# Patient Record
Sex: MALE | Race: WHITE | NOT HISPANIC OR LATINO | Employment: OTHER | URBAN - METROPOLITAN AREA
[De-identification: names, ages, dates, MRNs, and addresses within clinical notes are randomized per-mention and may not be internally consistent; named-entity substitution may affect disease eponyms.]

---

## 2018-05-29 ENCOUNTER — TRANSFERRED RECORDS (OUTPATIENT)
Dept: HEALTH INFORMATION MANAGEMENT | Facility: CLINIC | Age: 68
End: 2018-05-29

## 2018-09-14 ENCOUNTER — TRANSFERRED RECORDS (OUTPATIENT)
Dept: HEALTH INFORMATION MANAGEMENT | Facility: CLINIC | Age: 68
End: 2018-09-14

## 2018-10-26 ENCOUNTER — TRANSFERRED RECORDS (OUTPATIENT)
Dept: HEALTH INFORMATION MANAGEMENT | Facility: CLINIC | Age: 68
End: 2018-10-26

## 2018-11-14 ENCOUNTER — TRANSFERRED RECORDS (OUTPATIENT)
Dept: HEALTH INFORMATION MANAGEMENT | Facility: CLINIC | Age: 68
End: 2018-11-14

## 2018-11-16 ENCOUNTER — TRANSFERRED RECORDS (OUTPATIENT)
Dept: HEALTH INFORMATION MANAGEMENT | Facility: CLINIC | Age: 68
End: 2018-11-16

## 2018-11-19 ENCOUNTER — TRANSFERRED RECORDS (OUTPATIENT)
Dept: HEALTH INFORMATION MANAGEMENT | Facility: CLINIC | Age: 68
End: 2018-11-19

## 2018-12-04 ENCOUNTER — HOSPITAL ENCOUNTER (OUTPATIENT)
Dept: MRI IMAGING | Facility: CLINIC | Age: 68
Discharge: HOME | End: 2018-12-04
Attending: OTOLARYNGOLOGY | Admitting: OTOLARYNGOLOGY
Payer: MEDICARE

## 2018-12-04 DIAGNOSIS — R42 DIZZINESS: ICD-10-CM

## 2018-12-04 PROCEDURE — 25500064 ZZH RX 255 OP 636: Performed by: OTOLARYNGOLOGY

## 2018-12-04 PROCEDURE — 70553 MRI BRAIN STEM W/O & W/DYE: CPT

## 2018-12-04 PROCEDURE — A9585 GADOBUTROL INJECTION: HCPCS | Performed by: OTOLARYNGOLOGY

## 2018-12-04 RX ORDER — GADOBUTROL 604.72 MG/ML
7.5 INJECTION INTRAVENOUS ONCE
Status: DISCONTINUED | OUTPATIENT
Start: 2018-12-04 | End: 2018-12-04 | Stop reason: CLARIF

## 2018-12-04 RX ORDER — GADOBUTROL 604.72 MG/ML
10 INJECTION INTRAVENOUS ONCE
Status: COMPLETED | OUTPATIENT
Start: 2018-12-04 | End: 2018-12-04

## 2018-12-04 RX ADMIN — GADOBUTROL 10 ML: 604.72 INJECTION INTRAVENOUS at 14:21

## 2018-12-27 RX ORDER — OMEPRAZOLE 40 MG/1
40 CAPSULE, DELAYED RELEASE ORAL DAILY
COMMUNITY

## 2018-12-27 RX ORDER — METOPROLOL TARTRATE 100 MG
100 TABLET ORAL DAILY
COMMUNITY

## 2018-12-27 RX ORDER — TRIAMTERENE AND HYDROCHLOROTHIAZIDE 37.5; 25 MG/1; MG/1
CAPSULE ORAL 2 TIMES DAILY
COMMUNITY

## 2018-12-27 RX ORDER — ASPIRIN 81 MG/1
81 TABLET ORAL DAILY
Status: ON HOLD | COMMUNITY
End: 2018-12-31

## 2018-12-27 RX ORDER — HYDRALAZINE HYDROCHLORIDE 25 MG/1
25 TABLET, FILM COATED ORAL 3 TIMES DAILY
COMMUNITY

## 2018-12-27 RX ORDER — AMLODIPINE BESYLATE 5 MG/1
10 TABLET ORAL DAILY
COMMUNITY

## 2018-12-31 ENCOUNTER — ANESTHESIA EVENT (OUTPATIENT)
Dept: SURGERY | Facility: CLINIC | Age: 68
End: 2018-12-31
Payer: MEDICARE

## 2018-12-31 ENCOUNTER — ANESTHESIA (OUTPATIENT)
Dept: SURGERY | Facility: CLINIC | Age: 68
End: 2018-12-31
Payer: MEDICARE

## 2018-12-31 ENCOUNTER — HOSPITAL ENCOUNTER (OUTPATIENT)
Facility: CLINIC | Age: 68
Discharge: HOME | End: 2018-12-31
Attending: OTOLARYNGOLOGY | Admitting: OTOLARYNGOLOGY
Payer: MEDICARE

## 2018-12-31 VITALS
WEIGHT: 218.48 LBS | HEIGHT: 70 IN | OXYGEN SATURATION: 95 % | TEMPERATURE: 98 F | DIASTOLIC BLOOD PRESSURE: 73 MMHG | SYSTOLIC BLOOD PRESSURE: 126 MMHG | BODY MASS INDEX: 31.28 KG/M2 | HEART RATE: 78 BPM | RESPIRATION RATE: 14 BRPM

## 2018-12-31 DIAGNOSIS — H81.01 MENIERE'S DISEASE (COCHLEAR HYDROPS), RIGHT: Primary | ICD-10-CM

## 2018-12-31 LAB
CREAT SERPL-MCNC: 0.76 MG/DL (ref 0.66–1.25)
GFR SERPL CREATININE-BSD FRML MDRD: >90 ML/MIN/{1.73_M2}
GLUCOSE SERPL-MCNC: 94 MG/DL (ref 70–99)
POTASSIUM SERPL-SCNC: 3.1 MMOL/L (ref 3.4–5.3)

## 2018-12-31 PROCEDURE — 25000132 ZZH RX MED GY IP 250 OP 250 PS 637: Mod: GY | Performed by: OTOLARYNGOLOGY

## 2018-12-31 PROCEDURE — 25000128 H RX IP 250 OP 636: Performed by: NURSE ANESTHETIST, CERTIFIED REGISTERED

## 2018-12-31 PROCEDURE — 25000565 ZZH ISOFLURANE, EA 15 MIN: Performed by: OTOLARYNGOLOGY

## 2018-12-31 PROCEDURE — 36415 COLL VENOUS BLD VENIPUNCTURE: CPT | Performed by: ANESTHESIOLOGY

## 2018-12-31 PROCEDURE — 82565 ASSAY OF CREATININE: CPT | Performed by: ANESTHESIOLOGY

## 2018-12-31 PROCEDURE — 25000128 H RX IP 250 OP 636: Performed by: OTOLARYNGOLOGY

## 2018-12-31 PROCEDURE — 40000170 ZZH STATISTIC PRE-PROCEDURE ASSESSMENT II: Performed by: OTOLARYNGOLOGY

## 2018-12-31 PROCEDURE — 84132 ASSAY OF SERUM POTASSIUM: CPT | Performed by: ANESTHESIOLOGY

## 2018-12-31 PROCEDURE — 71000014 ZZH RECOVERY PHASE 1 LEVEL 2 FIRST HR: Performed by: OTOLARYNGOLOGY

## 2018-12-31 PROCEDURE — 25000128 H RX IP 250 OP 636: Performed by: ANESTHESIOLOGY

## 2018-12-31 PROCEDURE — 37000009 ZZH ANESTHESIA TECHNICAL FEE, EACH ADDTL 15 MIN: Performed by: OTOLARYNGOLOGY

## 2018-12-31 PROCEDURE — 25000125 ZZHC RX 250: Performed by: NURSE ANESTHETIST, CERTIFIED REGISTERED

## 2018-12-31 PROCEDURE — 37000008 ZZH ANESTHESIA TECHNICAL FEE, 1ST 30 MIN: Performed by: OTOLARYNGOLOGY

## 2018-12-31 PROCEDURE — 71000027 ZZH RECOVERY PHASE 2 EACH 15 MINS: Performed by: OTOLARYNGOLOGY

## 2018-12-31 PROCEDURE — 36000062 ZZH SURGERY LEVEL 4 1ST 30 MIN - UMMC: Performed by: OTOLARYNGOLOGY

## 2018-12-31 PROCEDURE — A9270 NON-COVERED ITEM OR SERVICE: HCPCS | Mod: GY | Performed by: OTOLARYNGOLOGY

## 2018-12-31 PROCEDURE — 27210794 ZZH OR GENERAL SUPPLY STERILE: Performed by: OTOLARYNGOLOGY

## 2018-12-31 PROCEDURE — 82947 ASSAY GLUCOSE BLOOD QUANT: CPT | Performed by: ANESTHESIOLOGY

## 2018-12-31 PROCEDURE — 25000125 ZZHC RX 250: Performed by: OTOLARYNGOLOGY

## 2018-12-31 PROCEDURE — 36000064 ZZH SURGERY LEVEL 4 EA 15 ADDTL MIN - UMMC: Performed by: OTOLARYNGOLOGY

## 2018-12-31 DEVICE — SHEET SILICONE 38MMX51MMX0.13MM  20-10685: Type: IMPLANTABLE DEVICE | Site: EAR | Status: FUNCTIONAL

## 2018-12-31 RX ORDER — SODIUM CHLORIDE, SODIUM LACTATE, POTASSIUM CHLORIDE, CALCIUM CHLORIDE 600; 310; 30; 20 MG/100ML; MG/100ML; MG/100ML; MG/100ML
INJECTION, SOLUTION INTRAVENOUS CONTINUOUS
Status: DISCONTINUED | OUTPATIENT
Start: 2018-12-31 | End: 2018-12-31 | Stop reason: HOSPADM

## 2018-12-31 RX ORDER — ONDANSETRON 2 MG/ML
INJECTION INTRAMUSCULAR; INTRAVENOUS PRN
Status: DISCONTINUED | OUTPATIENT
Start: 2018-12-31 | End: 2018-12-31

## 2018-12-31 RX ORDER — PROPOFOL 10 MG/ML
INJECTION, EMULSION INTRAVENOUS PRN
Status: DISCONTINUED | OUTPATIENT
Start: 2018-12-31 | End: 2018-12-31

## 2018-12-31 RX ORDER — FENTANYL CITRATE 50 UG/ML
25-50 INJECTION, SOLUTION INTRAMUSCULAR; INTRAVENOUS
Status: DISCONTINUED | OUTPATIENT
Start: 2018-12-31 | End: 2018-12-31 | Stop reason: HOSPADM

## 2018-12-31 RX ORDER — OXYCODONE HYDROCHLORIDE 5 MG/1
5-10 TABLET ORAL EVERY 4 HOURS PRN
Qty: 12 TABLET | Refills: 0 | Status: SHIPPED | OUTPATIENT
Start: 2018-12-31 | End: 2019-01-03

## 2018-12-31 RX ORDER — FENTANYL CITRATE 50 UG/ML
INJECTION, SOLUTION INTRAMUSCULAR; INTRAVENOUS PRN
Status: DISCONTINUED | OUTPATIENT
Start: 2018-12-31 | End: 2018-12-31

## 2018-12-31 RX ORDER — AMOXICILLIN 500 MG/1
500 CAPSULE ORAL 2 TIMES DAILY
Qty: 20 CAPSULE | Refills: 0 | Status: SHIPPED | OUTPATIENT
Start: 2018-12-31 | End: 2019-01-10

## 2018-12-31 RX ORDER — SODIUM CHLORIDE, SODIUM LACTATE, POTASSIUM CHLORIDE, CALCIUM CHLORIDE 600; 310; 30; 20 MG/100ML; MG/100ML; MG/100ML; MG/100ML
INJECTION, SOLUTION INTRAVENOUS CONTINUOUS PRN
Status: DISCONTINUED | OUTPATIENT
Start: 2018-12-31 | End: 2018-12-31

## 2018-12-31 RX ORDER — HYDROMORPHONE HYDROCHLORIDE 1 MG/ML
.3-.5 INJECTION, SOLUTION INTRAMUSCULAR; INTRAVENOUS; SUBCUTANEOUS EVERY 5 MIN PRN
Status: DISCONTINUED | OUTPATIENT
Start: 2018-12-31 | End: 2018-12-31 | Stop reason: HOSPADM

## 2018-12-31 RX ORDER — CEFAZOLIN SODIUM 1 G/3ML
1 INJECTION, POWDER, FOR SOLUTION INTRAMUSCULAR; INTRAVENOUS SEE ADMIN INSTRUCTIONS
Status: DISCONTINUED | OUTPATIENT
Start: 2018-12-31 | End: 2018-12-31 | Stop reason: HOSPADM

## 2018-12-31 RX ORDER — CEFAZOLIN SODIUM 2 G/100ML
2 INJECTION, SOLUTION INTRAVENOUS
Status: COMPLETED | OUTPATIENT
Start: 2018-12-31 | End: 2018-12-31

## 2018-12-31 RX ORDER — MEPERIDINE HYDROCHLORIDE 25 MG/ML
12.5 INJECTION INTRAMUSCULAR; INTRAVENOUS; SUBCUTANEOUS EVERY 5 MIN PRN
Status: DISCONTINUED | OUTPATIENT
Start: 2018-12-31 | End: 2018-12-31 | Stop reason: HOSPADM

## 2018-12-31 RX ORDER — LIDOCAINE HYDROCHLORIDE 20 MG/ML
INJECTION, SOLUTION INFILTRATION; PERINEURAL PRN
Status: DISCONTINUED | OUTPATIENT
Start: 2018-12-31 | End: 2018-12-31

## 2018-12-31 RX ORDER — ONDANSETRON 4 MG/1
4 TABLET, ORALLY DISINTEGRATING ORAL EVERY 30 MIN PRN
Status: DISCONTINUED | OUTPATIENT
Start: 2018-12-31 | End: 2018-12-31 | Stop reason: HOSPADM

## 2018-12-31 RX ORDER — LIDOCAINE HYDROCHLORIDE AND EPINEPHRINE 10; 10 MG/ML; UG/ML
INJECTION, SOLUTION INFILTRATION; PERINEURAL PRN
Status: DISCONTINUED | OUTPATIENT
Start: 2018-12-31 | End: 2018-12-31 | Stop reason: HOSPADM

## 2018-12-31 RX ORDER — ASPIRIN 81 MG/1
81 TABLET ORAL DAILY
Qty: 30 TABLET | Refills: 0 | Status: SHIPPED | OUTPATIENT
Start: 2018-12-31 | End: 2019-01-30

## 2018-12-31 RX ORDER — DEXAMETHASONE SODIUM PHOSPHATE 4 MG/ML
INJECTION, SOLUTION INTRA-ARTICULAR; INTRALESIONAL; INTRAMUSCULAR; INTRAVENOUS; SOFT TISSUE PRN
Status: DISCONTINUED | OUTPATIENT
Start: 2018-12-31 | End: 2018-12-31

## 2018-12-31 RX ORDER — ONDANSETRON 2 MG/ML
4 INJECTION INTRAMUSCULAR; INTRAVENOUS EVERY 30 MIN PRN
Status: DISCONTINUED | OUTPATIENT
Start: 2018-12-31 | End: 2018-12-31 | Stop reason: HOSPADM

## 2018-12-31 RX ORDER — NALOXONE HYDROCHLORIDE 0.4 MG/ML
.1-.4 INJECTION, SOLUTION INTRAMUSCULAR; INTRAVENOUS; SUBCUTANEOUS
Status: DISCONTINUED | OUTPATIENT
Start: 2018-12-31 | End: 2018-12-31 | Stop reason: HOSPADM

## 2018-12-31 RX ORDER — EPHEDRINE SULFATE 50 MG/ML
INJECTION, SOLUTION INTRAMUSCULAR; INTRAVENOUS; SUBCUTANEOUS PRN
Status: DISCONTINUED | OUTPATIENT
Start: 2018-12-31 | End: 2018-12-31

## 2018-12-31 RX ORDER — OXYCODONE HYDROCHLORIDE 5 MG/1
5 TABLET ORAL
Status: COMPLETED | OUTPATIENT
Start: 2018-12-31 | End: 2018-12-31

## 2018-12-31 RX ORDER — EPINEPHRINE NASAL SOLUTION 1 MG/ML
SOLUTION NASAL PRN
Status: DISCONTINUED | OUTPATIENT
Start: 2018-12-31 | End: 2018-12-31 | Stop reason: HOSPADM

## 2018-12-31 RX ORDER — GENTAMICIN 40 MG/ML
INJECTION, SOLUTION INTRAMUSCULAR; INTRAVENOUS CONTINUOUS PRN
Status: DISCONTINUED | OUTPATIENT
Start: 2018-12-31 | End: 2018-12-31 | Stop reason: HOSPADM

## 2018-12-31 RX ADMIN — SODIUM CHLORIDE, POTASSIUM CHLORIDE, SODIUM LACTATE AND CALCIUM CHLORIDE: 600; 310; 30; 20 INJECTION, SOLUTION INTRAVENOUS at 09:59

## 2018-12-31 RX ADMIN — CEFAZOLIN SODIUM 1 G: 2 INJECTION, SOLUTION INTRAVENOUS at 09:54

## 2018-12-31 RX ADMIN — DEXAMETHASONE SODIUM PHOSPHATE 4 MG: 4 INJECTION, SOLUTION INTRAMUSCULAR; INTRAVENOUS at 08:07

## 2018-12-31 RX ADMIN — FENTANYL CITRATE 25 MCG: 50 INJECTION, SOLUTION INTRAMUSCULAR; INTRAVENOUS at 10:15

## 2018-12-31 RX ADMIN — ONDANSETRON 4 MG: 2 INJECTION INTRAMUSCULAR; INTRAVENOUS at 11:49

## 2018-12-31 RX ADMIN — PROPOFOL 50 MG: 10 INJECTION, EMULSION INTRAVENOUS at 10:15

## 2018-12-31 RX ADMIN — SODIUM CHLORIDE, POTASSIUM CHLORIDE, SODIUM LACTATE AND CALCIUM CHLORIDE: 600; 310; 30; 20 INJECTION, SOLUTION INTRAVENOUS at 07:28

## 2018-12-31 RX ADMIN — FENTANYL CITRATE 50 MCG: 50 INJECTION, SOLUTION INTRAMUSCULAR; INTRAVENOUS at 08:20

## 2018-12-31 RX ADMIN — PROPOFOL 150 MG: 10 INJECTION, EMULSION INTRAVENOUS at 07:41

## 2018-12-31 RX ADMIN — Medication 10 MG: at 08:46

## 2018-12-31 RX ADMIN — FENTANYL CITRATE 25 MCG: 50 INJECTION, SOLUTION INTRAMUSCULAR; INTRAVENOUS at 09:54

## 2018-12-31 RX ADMIN — Medication 0.5 MG: at 11:09

## 2018-12-31 RX ADMIN — OXYCODONE HYDROCHLORIDE 5 MG: 5 TABLET ORAL at 13:22

## 2018-12-31 RX ADMIN — Medication 5 MG: at 08:39

## 2018-12-31 RX ADMIN — ROCURONIUM BROMIDE 50 MG: 10 INJECTION INTRAVENOUS at 07:41

## 2018-12-31 RX ADMIN — ONDANSETRON 4 MG: 2 INJECTION INTRAMUSCULAR; INTRAVENOUS at 10:06

## 2018-12-31 RX ADMIN — Medication 0.2 MG: at 10:56

## 2018-12-31 RX ADMIN — SUGAMMADEX 200 MG: 100 INJECTION, SOLUTION INTRAVENOUS at 10:09

## 2018-12-31 RX ADMIN — OXYCODONE HYDROCHLORIDE 5 MG: 5 TABLET ORAL at 11:33

## 2018-12-31 RX ADMIN — LIDOCAINE HYDROCHLORIDE 100 MG: 20 INJECTION, SOLUTION INFILTRATION; PERINEURAL at 07:41

## 2018-12-31 RX ADMIN — CEFAZOLIN SODIUM 2 G: 2 INJECTION, SOLUTION INTRAVENOUS at 07:56

## 2018-12-31 RX ADMIN — Medication 0.3 MG: at 10:44

## 2018-12-31 RX ADMIN — FENTANYL CITRATE 100 MCG: 50 INJECTION, SOLUTION INTRAMUSCULAR; INTRAVENOUS at 07:41

## 2018-12-31 RX ADMIN — Medication 10 MG: at 08:04

## 2018-12-31 ASSESSMENT — MIFFLIN-ST. JEOR: SCORE: 1757.25

## 2018-12-31 NOTE — ANESTHESIA PREPROCEDURE EVALUATION
Anesthesia Evaluation       history and physical reviewed . Pt has had prior anesthetic. Type: General    No history of anesthetic complications     ROS/MED HX    Pulmonary:  - neg pulmonary ROS   (+) sleep apnea uses CPAP     Neurologic:  - neg neurologic ROS     Cardiovascular:  - neg cardiovascular ROS   (+) hypertension--CAD, --date: 2006- RCA and circumflex, 2 . : . . . :. .     METS/Exercise Tolerance:     Hematologic:  - neg hematologic  ROS     Musculoskeletal:  - neg musculoskeletal ROS     GI/Hepatic:  - neg GI/hepatic ROS   (+) GERD Asymptomatic on medication,     Renal:  - ROS Renal section negative     Endo:  - neg endo ROS       Psychiatric:  - neg psychiatric ROS     Infectious Disease:  - neg infectious disease ROS     Other:  - neg other ROS          Physical Exam  Normal systems: cardiovascular, pulmonary and dental    Airway   Mallampati: II  TM distance: >3 FB  Neck ROM: full    Dental     Cardiovascular       Pulmonary                     Anesthesia Plan      History & Physical Review  History and physical reviewed and following examination; no interval change.    ASA Status:  3 .        Plan for General with Propofol and Intravenous induction. Maintenance will be TIVA.      Additional equipment: Videolaryngoscope      Postoperative Care      Consents  Anesthetic plan, risks, benefits and alternatives discussed with:  Patient or representative..            .    Anesthesia Evaluation       history and physical reviewed . Pt has had prior anesthetic. Type: General    No history of anesthetic complications          ROS/MED HX    ENT/Pulmonary:  - neg pulmonary ROS   (+)sleep apnea, uses CPAP , . .    Neurologic:  - neg neurologic ROS     Cardiovascular:  - neg cardiovascular ROS   (+) hypertension--CAD, --date: 2006- RCA and circumflex, 2 . : . . . :. .       METS/Exercise Tolerance:     Hematologic:  - neg hematologic  ROS       Musculoskeletal:  - neg musculoskeletal ROS       GI/Hepatic:  -  neg GI/hepatic ROS   (+) GERD Asymptomatic on medication,       Renal/Genitourinary:  - ROS Renal section negative       Endo:  - neg endo ROS       Psychiatric:  - neg psychiatric ROS       Infectious Disease:  - neg infectious disease ROS       Malignancy:         Other:    - neg other ROS                     PHYSICAL EXAM:   Mental Status/Neuro: A/A/O   Airway: Facies: Feasible  Mallampati: III  Mouth/Opening: Full  TM distance: > 6 cm  Neck ROM: Full   Respiratory: Auscultation: CTAB     Resp. Rate: Normal     Resp. Effort: Normal      CV: Rhythm: Regular  Rate: Age appropriate  Heart: Normal Sounds   Comments:      Dental: Normal                  Assessment:   ASA SCORE: 3

## 2018-12-31 NOTE — ANESTHESIA POSTPROCEDURE EVALUATION
Anesthesia POST Procedure Evaluation    Patient: Arvin Britt   MRN:     [id number] Gender:   male   Age:    68 year old :      1950        Preoperative Diagnosis: Meniere's Disease   Procedure(s):  Right Endolymphatic Sac Enhancement, Sigmoid Sinus Decompression, Extended Facial Recess Approach, Complete Mastoidectomy, Myringotomy and Tube, Removal Of Scar Tissue, Gentamicin Infusion   Postop Comments: No value filed.       Anesthesia Type:  Not documented    Reportable Event: NO     PAIN: Uncomplicated   Sign Out status: Comfortable, Well controlled pain     PONV: No PONV   Sign Out status:  No Nausea or Vomiting     Neuro/Psych: Uneventful perioperative course   Sign Out Status: Preoperative baseline; Age appropriate mentation     Airway/Resp.: Uneventful perioperative course   Sign Out Status: Non labored breathing, age appropriate RR; Resp. Status within EXPECTED Parameters     CV: Uneventful perioperative course   Sign Out status: Appropriate BP and perfusion indices; Appropriate HR/Rhythm     Disposition:   Sign Out in:  PACU  Disposition:  Phase II; Home  Recovery Course: Uneventful  Follow-Up: Not required           Last Anesthesia Record Vitals:      Last PACU/Preop Vitals:  Vitals:    18 0553   BP: (!) 155/97   Pulse: 119   Resp: 16   Temp: 36.7  C (98.1  F)         Electronically Signed By: Rubin Mcgarry MD, MD, 2018, 6:48 AM

## 2018-12-31 NOTE — OP NOTE
Procedure Date: 12/31/2018      PREOPERATIVE DIAGNOSIS:  Meniere's disease, right.      POSTOPERATIVE DIAGNOSIS:  Meniere's disease, right.      PROCEDURES:   1.  Right revision endolymphatic sac enhancement.   2.  Sigmoid sinus decompression.     3.  Complete mastoidectomy.     4.  Extended facial recess approach.   5.  Removal of extensive fibrous scar tissue.     6.  Gelfoam mastoid packing for CSF and bloody drainage.     7.  Difficult case anatomy and pathology.     SURGEON:  Ricky Mueller MD      ASSISTANT:  Mary Herbert DO.      ANESTHESIA:  General endotracheal intubation.      ESTIMATED BLOOD LOSS:  50 mL.      COMPLICATIONS:  None.      DRAINS:  None.      SPECIMENS:  None.      IMPLANTS:  Silastic spacers.      FINDINGS:   1.  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   2.  Extensive scar tissue invading the Trautmann triangle.   3.  Bleeding from the posterior fossa dura noted intraoperatively.   4.  Small CSF leak in the area of the endolymphatic sac intraoperatively, controlled with Gelfoam packing.     5.  Extensive scar tissue -- removed.    6.  Medium Silastic spacers placed.     7.  A very difficult case.      INDICATIONS:  The patient is a 68-year-old male who presented to the office with recurrent Meniere's disease.  He has had 3 previous endolymphatic sac procedures with resolution of his symptoms.  In the last 2 months he had had recurrence of his symptoms, including vertigo, and workup demonstrated a right ___ weakness.  The risks versus benefits and alternatives of the endolymphatic sac procedure were discussed with the patient including bleeding, infection, CSF leak, vertigo, dizziness, decreased hearing, and facial nerve paralysis.  The patient understood the risks of the procedure and wished to proceed with surgical management.      DESCRIPTION OF PROCEDURE:  After appropriate informed consent was signed and placed in the chart, the patient  was taken to the operative suite and placed in supine position and endotracheal intubated.  Bed was rotated 90 degrees and patient was placed in Trendelenburg positioning.  The patient was prepped and draped in a sterile fashion with Betadine solution.  The microscope was sterilely draped and used throughout the duration of the case.  The timeout was performed to identify the correct patient, procedure, and laterality.      The postauricular skin was then injected with 3 mL of 1% lidocaine with epinephrine.  A #15 blade scalpel was used to incise the previous scar.  Bovie electrocautery was used for hemostasis and to elevate the ear anteriorly.  The periosteum was elevated in a separate layer.  A Lempert elevator was used to remove the scar tissue out of the mastoid vault, as well as a Derby elevator.  There was extensive serge-osteogenesis in the region of the sigmoid sinus, which was removed with a #4 sergio bur.  A #6 cutting bur was used to remove bone overlying the antrum, as well as inferiorly where a large bony spur was located.  The sigmoid sinus was identified and had previously been decompressed.  There was no bone overlying it.  There was extensive scar tissue in the region of the sigmoid sinus, which was removed with a cup forceps and a Derby elevator.  The previously placed spacers were identified and were removed with the scar tissue.  During the removal of the extensive scar tissue in the endolymphatic sac, there was evidence of a CSF leak.  Medium sized spacers were then placed and a large amount of Gelfoam was used to pack the mastoid in entirety.  There was no recurrent CSF leak at the end of the case.  It was well controlled easily.  The wound was then closed in multiple layers with a 3-0 Vicryl suture to reapproximate the periosteum, as well as the subcutaneous tissue, and staples to reapproximate the skin.  A standard mastoid dressing was then applied.  The patient was returned to Anesthesia for  uncomplicated emergence.  Dr. Mueller was present and performed the key portions of the procedure.         ANNE MUELLER MD       As dictated by SONIA PIEDRA DO            D: 2018   T: 2018   MT: MOISES      Name:     PAULA COSTELLO   MRN:      [id number]        Account:        [id number]   :      1950           Procedure Date: 2018      Document: [id number]

## 2018-12-31 NOTE — DISCHARGE INSTRUCTIONS
Same-Day Surgery   Adult Discharge Orders & Instructions     For 24 hours after surgery:  1. Get plenty of rest.  A responsible adult must stay with you for at least 24 hours after you leave the hospital.   2. Pain medication can slow your reflexes. Do not drive or use heavy equipment.  If you have weakness or tingling, don't drive or use heavy equipment until this feeling goes away.  3. Mixing alcohol and pain medication can cause dizziness and slow your breathing. It can even be fatal. Do not drink alcohol while taking pain medication.  4. Avoid strenuous or risky activities.  Ask for help when climbing stairs.   5. You may feel lightheaded.  If so, sit for a few minutes before standing.  Have someone help you get up.   6. If you have nausea (feel sick to your stomach), drink only clear liquids such as apple juice, ginger ale, broth or 7-Up.  Rest may also help.  Be sure to drink enough fluids.  Move to a regular diet as you feel able. Take pain medications with a small amount of solid food, such as toast or crackers, to avoid nausea.   7. A slight fever is normal. Call the doctor if your fever is over 100 F (37.7 C) (taken under the tongue) or lasts longer than 24 hours.  8. You may have a dry mouth, muscle aches, trouble sleeping or a sore throat.  These symptoms should go away after 24 hours.  9. Do not make important or legal decisions.   Pain Management:      1. Take pain medication (if prescribed) for pain as directed by your physician.        2. WARNING: If the pain medication you have been prescribed contains Tylenol  (acetaminophen), DO NOT take additional doses of Tylenol (acetaminophen).     Call your doctor for any of the followin.  Signs of infection (fever, growing tenderness at the surgery site, severe pain, a large amount of drainage or bleeding, foul-smelling drainage, redness, swelling).    2.  It has been over 8 to 10 hours since surgery and you are still not able to urinate (pee).    3.   Headache for over 24 hours.    4.  Numbness, tingling or weakness the day after surgery (if you had spinal anesthesia).  To contact a doctor, call _____________________________________ or:      [phone] and ask for the Resident On Call for:          __________________________________________ (answered 24 hours a day)      Emergency Department:  [facility]: [phone]  [facility]: [phone]               Rev. 10/2014

## 2018-12-31 NOTE — ANESTHESIA CARE TRANSFER NOTE
Patient: Arvin Britt    Procedure(s):  Right Endolymphatic Sac Enhancement, Sigmoid Sinus Decompression, Extended Facial Recess Approach, Complete Mastoidectomy, Removal Of Scar Tissue    Diagnosis: Meniere's Disease  Diagnosis Additional Information: No value filed.    Anesthesia Type:   General     Note:  Airway :Face Mask  Patient transferred to:PACU  Handoff Report: Identifed the Patient, Identified the Reponsible Provider, Reviewed the pertinent medical history, Discussed the surgical course, Reviewed Intra-OP anesthesia mangement and issues during anesthesia, Set expectations for post-procedure period and Allowed opportunity for questions and acknowledgement of understanding      Vitals: (Last set prior to Anesthesia Care Transfer)    CRNA VITALS  12/31/2018 0959 - 12/31/2018 1034      12/31/2018             Pulse:  85    SpO2:  95 %                Electronically Signed By: JEFF Gomez CRNA  December 31, 2018  10:34 AM

## 2018-12-31 NOTE — BRIEF OP NOTE
[facility], [place]    Brief Operative Note    Pre-operative diagnosis: Right Meniere's Disease  Post-operative diagnosis Right Meniere's disease  Procedure: Procedure(s):  Right Endolymphatic Sac Enhancement, Sigmoid Sinus Decompression, Extended Facial Recess Approach, Complete Mastoidectomy, Removal Of Scar Tissue, Difficult case  Surgeon: Surgeon(s) and Role:     * Ricky Mueller MD - Primary     * Mary Herbert DO - Assisting  Anesthesia: General   Estimated blood loss: 50 ml  Drains: None  Specimens: * No specimens in log *  Findings:    1. Large amount of neoosteogensis and scar tissue - removed.  2. CSF leak in region of endolymphatic sac  3. Medium spacers placed  4. Gelfoam mastoid packing  5. Anatomy confirms diagnosis  6. Difficult case.  Complications: None.  Implants: None.

## 2019-01-01 ENCOUNTER — EXTERNAL RECORD (OUTPATIENT)
Dept: HEALTH INFORMATION MANAGEMENT | Facility: OTHER | Age: 69
End: 2019-01-01

## 2019-01-29 ENCOUNTER — TRANSFERRED RECORDS (OUTPATIENT)
Dept: HEALTH INFORMATION MANAGEMENT | Facility: CLINIC | Age: 69
End: 2019-01-29

## 2019-04-30 ENCOUNTER — TRANSFERRED RECORDS (OUTPATIENT)
Dept: HEALTH INFORMATION MANAGEMENT | Facility: CLINIC | Age: 69
End: 2019-04-30

## 2019-05-28 ENCOUNTER — TRANSFERRED RECORDS (OUTPATIENT)
Dept: HEALTH INFORMATION MANAGEMENT | Facility: CLINIC | Age: 69
End: 2019-05-28

## 2019-08-23 ENCOUNTER — TRANSFERRED RECORDS (OUTPATIENT)
Dept: HEALTH INFORMATION MANAGEMENT | Facility: CLINIC | Age: 69
End: 2019-08-23

## 2019-10-05 ENCOUNTER — HEALTH MAINTENANCE LETTER (OUTPATIENT)
Age: 69
End: 2019-10-05

## 2019-10-22 ENCOUNTER — TRANSFERRED RECORDS (OUTPATIENT)
Dept: HEALTH INFORMATION MANAGEMENT | Facility: CLINIC | Age: 69
End: 2019-10-22

## 2020-02-10 ENCOUNTER — HEALTH MAINTENANCE LETTER (OUTPATIENT)
Age: 70
End: 2020-02-10

## 2020-07-07 ENCOUNTER — OFFICE VISIT (OUTPATIENT)
Dept: OTOLARYNGOLOGY | Facility: CLINIC | Age: 70
End: 2020-07-07
Payer: MEDICARE

## 2020-07-07 ENCOUNTER — OFFICE VISIT (OUTPATIENT)
Dept: AUDIOLOGY | Facility: CLINIC | Age: 70
End: 2020-07-07
Payer: MEDICARE

## 2020-07-07 VITALS — OXYGEN SATURATION: 99 % | HEART RATE: 56 BPM | SYSTOLIC BLOOD PRESSURE: 159 MMHG | DIASTOLIC BLOOD PRESSURE: 86 MMHG

## 2020-07-07 DIAGNOSIS — H90.3 SNHL (SENSORY-NEURAL HEARING LOSS), ASYMMETRICAL: Primary | ICD-10-CM

## 2020-07-07 DIAGNOSIS — H81.01 MENIERE'S DISEASE, RIGHT: Primary | ICD-10-CM

## 2020-07-07 PROCEDURE — 99213 OFFICE O/P EST LOW 20 MIN: CPT | Performed by: OTOLARYNGOLOGY

## 2020-07-07 PROCEDURE — 92567 TYMPANOMETRY: CPT | Performed by: AUDIOLOGIST

## 2020-07-07 PROCEDURE — 92557 COMPREHENSIVE HEARING TEST: CPT | Performed by: AUDIOLOGIST

## 2020-07-07 RX ORDER — PANTOPRAZOLE SODIUM 40 MG/1
TABLET, DELAYED RELEASE ORAL 2 TIMES DAILY
COMMUNITY
Start: 2020-05-27

## 2020-07-07 RX ORDER — TRIAMTERENE AND HYDROCHLOROTHIAZIDE 37.5; 25 MG/1; MG/1
1 CAPSULE ORAL EVERY MORNING
Qty: 30 CAPSULE | Refills: 3 | Status: SHIPPED | OUTPATIENT
Start: 2020-07-07

## 2020-07-07 RX ORDER — CHLORAL HYDRATE 500 MG
2 CAPSULE ORAL DAILY
COMMUNITY

## 2020-07-07 RX ORDER — ISOSORBIDE MONONITRATE 30 MG/1
30 TABLET, EXTENDED RELEASE ORAL DAILY
COMMUNITY
Start: 2020-05-27

## 2020-07-07 ASSESSMENT — ENCOUNTER SYMPTOMS
BRUISES/BLEEDS EASILY: 0
TINGLING: 0
VOMITING: 1
SPUTUM PRODUCTION: 0
TREMORS: 0
NAUSEA: 1
DIZZINESS: 1
DOUBLE VISION: 0
CONSTITUTIONAL NEGATIVE: 1
STRIDOR: 0
HEADACHES: 0
SINUS PAIN: 0
BLURRED VISION: 0
HEARTBURN: 1
COUGH: 0
HEMOPTYSIS: 0
PHOTOPHOBIA: 0

## 2020-07-07 NOTE — LETTER
7/7/2020         RE: Arvin Britt  [address] [place] [address]        Dear Colleague,    Thank you for referring your patient, Arvin Britt, to the [facility]. Please see a copy of my visit note below.    HPI    This is a 70 year old patient with Meniere's disease. He is a patient of Dr. Mueller for many years. He did have two episodes of vertigo in the past several weeks; lasting around 5 hours of spinning sensation. Associated with n/v, aural pressure and severe tinnitus. He has been diagnosed with left Meniere's disease. He has a hx of 4 Endolymphatic sac surgery in his left ear. No allergies, vision changes, weakness, or trauma.  His recent MRI (a year ago): WNLs.    Hearing test: Sharply sloping to severe SNHL left; moderate to severe SNHL right. Excellent recognition, left. Fair recognition in right. Tymps: WNLs.     Surgical History      12/31/2018 Enhance endolymphatic sac, dec sigmoid sinus, extnd facial recess apprch, mastoidectomy, bmt, combo (Right) Procedure: Right Endolymphatic Sac Enhancement, Sigmoid Sinus Decompression, Extended Facial Recess Approach, Complete Mastoidectomy, Removal Of Scar Tissue; Surgeon: Ricyk Mueller MD; Location: [facility] OR   6/28/2012 Enhance endolymphatic sac, dec sigmoid sinus, extnd facial recess apprch, mastoidectomy, bmt, combo         Medical History         Coronary artery disease    Dyslipidemia    Gastro-oesophageal reflux disease    Hearing loss of right ear    Hypertension    Meniere's disease    Obesity    Sleep apnea         Medications    New medications from outside sources are available for reconciliation    amLODIPine (NORVASC) 5 MG tablet     aspirin (ASA) 81 MG EC tablet     atorvastatin (LIPITOR) 20 MG tablet     fish oil-omega-3 fatty acids 1000 MG capsule     hydrALAZINE (APRESOLINE) 25 MG tablet     isosorbide mononitrate (IMDUR) 30 MG 24 hr tablet     lisinopril (PRINIVIL,ZESTRIL) 40 MG tablet     metoprolol  tartrate (LOPRESSOR) 100 MG tablet     Multiple Vitamin (MULTIVITAMIN OR)     pantoprazole (PROTONIX) 40 MG EC tablet     triamterene-HCTZ (DYAZIDE) 37.5-25 MG capsule     cholecalciferol 1000 UNIT TABS     MECLIZINE HCL PO     nitroglycerin (NITROSTAT) 0.3 MG SL tablet     omeprazole (PRILOSEC) 40 MG DR capsule        Review of Systems   Constitutional: Negative.    HENT: Positive for hearing loss and tinnitus. Negative for congestion, ear discharge, ear pain, nosebleeds and sinus pain.    Eyes: Negative for blurred vision, double vision and photophobia.   Respiratory: Negative for cough, hemoptysis, sputum production and stridor.    Gastrointestinal: Positive for heartburn, nausea and vomiting.   Skin: Negative.    Neurological: Positive for dizziness. Negative for tingling, tremors and headaches.   Endo/Heme/Allergies: Negative for environmental allergies. Does not bruise/bleed easily.         Physical Exam  Vitals signs reviewed.   Constitutional:       Appearance: Normal appearance. He is normal weight.   HENT:      Head: Normocephalic and atraumatic.      Jaw: There is normal jaw occlusion.      Right Ear: Ear canal and external ear normal. Decreased hearing noted. No tenderness. No middle ear effusion. There is no impacted cerumen. Tympanic membrane is scarred.      Left Ear: Tympanic membrane, ear canal and external ear normal. Decreased hearing noted.  No middle ear effusion. There is no impacted cerumen.      Nose: No septal deviation, laceration, mucosal edema, congestion or rhinorrhea.      Right Turbinates: Not enlarged or swollen.      Left Turbinates: Not enlarged or swollen.      Mouth/Throat:      Mouth: Mucous membranes are moist.      Tongue: No lesions. Tongue does not deviate from midline.      Palate: No mass.      Pharynx: Oropharynx is clear. Uvula midline.   Eyes:      Extraocular Movements: Extraocular movements intact.      Pupils: Pupils are equal, round, and reactive to light.    Neurological:      Mental Status: He is alert.     No Spontaneous nystagmus    A/P  This 70 year old patient with Meniere's disease did have two episodes of vertigo in the past several weeks; lasting around 5 hours of spinning sensation. He has been diagnosed with left Meniere's disease. He has a hx of 4 Endolymphatic sac surgery in his left ear.  Hearing test: Sharply sloping to severe SNHL left; moderate to severe SNHL right. Excellent recognition, left. Fair recognition in right. Tymps: WNLs. Options including intratympanic Dexamethasone inoculation were discussed and his questions were answered.       Again, thank you for allowing me to participate in the care of your patient.        Sincerely,        Brandi Rouse MD

## 2020-07-07 NOTE — PROGRESS NOTES
AUDIOLOGY REPORT    SUMMARY: Audiology visit completed. See audiogram for results.    RECOMMENDATIONS: Follow-up with ENT.    Juan Carlos Bryant  Doctor of Audiology  [place] License # [id number]

## 2020-07-07 NOTE — NURSING NOTE
Arvin Britt's goals for this visit include:   Chief Complaint   Patient presents with     Consult     Meniere's Disease     He requests these members of his care team be copied on today's visit information: Yes    PCP: Gideon Hall    Referring Provider:  No referring provider defined for this encounter.    BP (!) 159/86 (BP Location: Right arm, Patient Position: Sitting, Cuff Size: Adult Regular)   Pulse 56   SpO2 99%     Do you need any medication refills at today's visit? No    Dawn Boyd LPN

## 2020-07-07 NOTE — PROGRESS NOTES
HPI    This is a 70 year old patient with Meniere's disease. He is a patient of Dr. Mueller for many years. He did have two episodes of vertigo in the past several weeks; lasting around 5 hours of spinning sensation. Associated with n/v, aural pressure and severe tinnitus. He has been diagnosed with left Meniere's disease. He has a hx of 4 Endolymphatic sac surgery in his left ear. No allergies, vision changes, weakness, or trauma.  His recent MRI (a year ago): WNLs.    Hearing test: Sharply sloping to severe SNHL left; moderate to severe SNHL right. Excellent recognition, left. Fair recognition in right. Tymps: WNLs.     Surgical History      12/31/2018 Enhance endolymphatic sac, dec sigmoid sinus, extnd facial recess apprch, mastoidectomy, bmt, combo (Right) Procedure: Right Endolymphatic Sac Enhancement, Sigmoid Sinus Decompression, Extended Facial Recess Approach, Complete Mastoidectomy, Removal Of Scar Tissue; Surgeon: Ricky Mueller MD; Location: [facility]   6/28/2012 Enhance endolymphatic sac, dec sigmoid sinus, extnd facial recess apprch, mastoidectomy, bmt, combo         Medical History         Coronary artery disease    Dyslipidemia    Gastro-oesophageal reflux disease    Hearing loss of right ear    Hypertension    Meniere's disease    Obesity    Sleep apnea         Medications    New medications from outside sources are available for reconciliation    amLODIPine (NORVASC) 5 MG tablet     aspirin (ASA) 81 MG EC tablet     atorvastatin (LIPITOR) 20 MG tablet     fish oil-omega-3 fatty acids 1000 MG capsule     hydrALAZINE (APRESOLINE) 25 MG tablet     isosorbide mononitrate (IMDUR) 30 MG 24 hr tablet     lisinopril (PRINIVIL,ZESTRIL) 40 MG tablet     metoprolol tartrate (LOPRESSOR) 100 MG tablet     Multiple Vitamin (MULTIVITAMIN OR)     pantoprazole (PROTONIX) 40 MG EC tablet     triamterene-HCTZ (DYAZIDE) 37.5-25 MG capsule     cholecalciferol 1000 UNIT TABS     MECLIZINE HCL PO     nitroglycerin  (NITROSTAT) 0.3 MG SL tablet     omeprazole (PRILOSEC) 40 MG DR capsule        Review of Systems   Constitutional: Negative.    HENT: Positive for hearing loss and tinnitus. Negative for congestion, ear discharge, ear pain, nosebleeds and sinus pain.    Eyes: Negative for blurred vision, double vision and photophobia.   Respiratory: Negative for cough, hemoptysis, sputum production and stridor.    Gastrointestinal: Positive for heartburn, nausea and vomiting.   Skin: Negative.    Neurological: Positive for dizziness. Negative for tingling, tremors and headaches.   Endo/Heme/Allergies: Negative for environmental allergies. Does not bruise/bleed easily.         Physical Exam  Vitals signs reviewed.   Constitutional:       Appearance: Normal appearance. He is normal weight.   HENT:      Head: Normocephalic and atraumatic.      Jaw: There is normal jaw occlusion.      Right Ear: Ear canal and external ear normal. Decreased hearing noted. No tenderness. No middle ear effusion. There is no impacted cerumen. Tympanic membrane is scarred.      Left Ear: Tympanic membrane, ear canal and external ear normal. Decreased hearing noted.  No middle ear effusion. There is no impacted cerumen.      Nose: No septal deviation, laceration, mucosal edema, congestion or rhinorrhea.      Right Turbinates: Not enlarged or swollen.      Left Turbinates: Not enlarged or swollen.      Mouth/Throat:      Mouth: Mucous membranes are moist.      Tongue: No lesions. Tongue does not deviate from midline.      Palate: No mass.      Pharynx: Oropharynx is clear. Uvula midline.   Eyes:      Extraocular Movements: Extraocular movements intact.      Pupils: Pupils are equal, round, and reactive to light.   Neurological:      Mental Status: He is alert.     No Spontaneous nystagmus    A/P  This 70 year old patient with Meniere's disease did have two episodes of vertigo in the past several weeks; lasting around 5 hours of spinning sensation. He has been  diagnosed with left Meniere's disease. He has a hx of 4 Endolymphatic sac surgery in his left ear.  Hearing test: Sharply sloping to severe SNHL left; moderate to severe SNHL right. Excellent recognition, left. Fair recognition in right. Tymps: WNLs. Options including intratympanic Dexamethasone inoculation were discussed and his questions were answered.

## 2020-11-14 ENCOUNTER — HEALTH MAINTENANCE LETTER (OUTPATIENT)
Age: 70
End: 2020-11-14

## 2020-12-01 ENCOUNTER — OFFICE VISIT (OUTPATIENT)
Dept: OTOLARYNGOLOGY | Facility: CLINIC | Age: 70
End: 2020-12-01
Payer: MEDICARE

## 2020-12-01 VITALS
HEART RATE: 78 BPM | RESPIRATION RATE: 18 BRPM | OXYGEN SATURATION: 100 % | SYSTOLIC BLOOD PRESSURE: 164 MMHG | DIASTOLIC BLOOD PRESSURE: 88 MMHG

## 2020-12-01 DIAGNOSIS — H61.23 BILATERAL IMPACTED CERUMEN: Primary | ICD-10-CM

## 2020-12-01 PROCEDURE — 99213 OFFICE O/P EST LOW 20 MIN: CPT | Mod: 25 | Performed by: OTOLARYNGOLOGY

## 2020-12-01 PROCEDURE — 69210 REMOVE IMPACTED EAR WAX UNI: CPT | Performed by: OTOLARYNGOLOGY

## 2020-12-01 ASSESSMENT — PAIN SCALES - GENERAL: PAINLEVEL: NO PAIN (0)

## 2020-12-01 NOTE — LETTER
12/1/2020         RE: Arvin Britt  [address] [place] [address]        Dear Colleague,    Thank you for referring your patient, Arvin Britt, to the [facility]. Please see a copy of my visit note below.    HPI    This is a 70 year old patient with Meniere's disease. He is a patient of Dr. Mueller for many years. He did have two episodes of vertigo in the past several weeks; lasting around 5 hours of spinning sensation. Associated with n/v, aural pressure and severe tinnitus. He has been diagnosed with left Meniere's disease. He has a hx of 4 Endolymphatic sac surgery in his left ear. No allergies, vision changes, weakness, or trauma.  His recent MRI (a year ago): WNLs.    Hearing test: Sharply sloping to severe SNHL left; moderate to severe SNHL right. Excellent recognition, left. Fair recognition in right. Tymps: WNLs.     Surgical History      12/31/2018 Enhance endolymphatic sac, dec sigmoid sinus, extnd facial recess apprch, mastoidectomy, bmt, combo (Right) Procedure: Right Endolymphatic Sac Enhancement, Sigmoid Sinus Decompression, Extended Facial Recess Approach, Complete Mastoidectomy, Removal Of Scar Tissue; Surgeon: Ricky Mueller MD; Location: [facility] OR   6/28/2012 Enhance endolymphatic sac, dec sigmoid sinus, extnd facial recess apprch, mastoidectomy, bmt, combo         Medical History         Coronary artery disease    Dyslipidemia    Gastro-oesophageal reflux disease    Hearing loss of right ear    Hypertension    Meniere's disease    Obesity    Sleep apnea         Medications    New medications from outside sources are available for reconciliation    amLODIPine (NORVASC) 5 MG tablet     aspirin (ASA) 81 MG EC tablet     atorvastatin (LIPITOR) 20 MG tablet     fish oil-omega-3 fatty acids 1000 MG capsule     hydrALAZINE (APRESOLINE) 25 MG tablet     isosorbide mononitrate (IMDUR) 30 MG 24 hr tablet     lisinopril (PRINIVIL,ZESTRIL) 40 MG tablet      metoprolol tartrate (LOPRESSOR) 100 MG tablet     Multiple Vitamin (MULTIVITAMIN OR)     pantoprazole (PROTONIX) 40 MG EC tablet     triamterene-HCTZ (DYAZIDE) 37.5-25 MG capsule     cholecalciferol 1000 UNIT TABS     MECLIZINE HCL PO     nitroglycerin (NITROSTAT) 0.3 MG SL tablet     omeprazole (PRILOSEC) 40 MG DR capsule        Review of Systems   Constitutional: Negative.    HENT: Positive for hearing loss and tinnitus. Negative for congestion, ear discharge, ear pain, nosebleeds and sinus pain.    Eyes: Negative for blurred vision, double vision and photophobia.   Respiratory: Negative for cough, hemoptysis, sputum production and stridor.    Gastrointestinal: Positive for heartburn, nausea and vomiting.   Skin: Negative.    Neurological: Positive for dizziness. Negative for tingling, tremors and headaches.   Endo/Heme/Allergies: Negative for environmental allergies. Does not bruise/bleed easily.         Physical Exam  Vitals signs reviewed.   Constitutional:       Appearance: Normal appearance. He is normal weight.   HENT:      Head: Normocephalic and atraumatic.      Jaw: There is normal jaw occlusion.      Right Ear: Ear canal and external ear normal. Decreased hearing noted. No tenderness. No middle ear effusion. There is no impacted cerumen. Tympanic membrane is scarred.      Left Ear: Tympanic membrane, ear canal and external ear normal. Decreased hearing noted.  No middle ear effusion. There is no impacted cerumen.      Nose: No septal deviation, laceration, mucosal edema, congestion or rhinorrhea.      Right Turbinates: Not enlarged or swollen.      Left Turbinates: Not enlarged or swollen.      Mouth/Throat:      Mouth: Mucous membranes are moist.      Tongue: No lesions. Tongue does not deviate from midline.      Palate: No mass.      Pharynx: Oropharynx is clear. Uvula midline.   Eyes:      Extraocular Movements: Extraocular movements intact.      Pupils: Pupils are equal, round, and reactive to light.    Neurological:      Mental Status: He is alert.     No Spontaneous nystagmus.    Ear wax removal: He was seen in the room. Right ear canal was blocked with 100% ear wax that was removed with a curette and suctioning. Ear drum is intact with a dimeric membrane. He tolerated the procure well.    A/P  This 70 year old patient with Meniere's disease did have two episodes of vertigo in the past several weeks; lasting around 5 hours of spinning sensation. He has been diagnosed with left Meniere's disease. He has a hx of 4 Endolymphatic sac surgery in his left ear.  Hearing test: Sharply sloping to severe SNHL left; moderate to severe SNHL right. Excellent recognition, left. Fair recognition in right. Tymps: WNLs. Options were discussed and his questions were answered.         Again, thank you for allowing me to participate in the care of your patient.        Sincerely,        Brandi Rouse MD

## 2020-12-01 NOTE — NURSING NOTE
Arvin Britt's goals for this visit include:   Chief Complaint   Patient presents with     Follow Up     states that things are going well, has not had any bad episodes of vertigo for months. Does want ears cleaned.      He requests these members of his care team be copied on today's visit information:     PCP: Gideon Hall    Referring Provider:  No referring provider defined for this encounter.    BP (!) 164/88 (BP Location: Left arm, Patient Position: Sitting, Cuff Size: Adult Regular)   Pulse 78   Resp 18   SpO2 100%     Do you need any medication refills at today's visit? No    Cher Molina LPN

## 2020-12-01 NOTE — PROGRESS NOTES
HPI    This is a 70 year old patient with Meniere's disease. He is a patient of Dr. Mueller for many years. He did have two episodes of vertigo in the past several weeks; lasting around 5 hours of spinning sensation. Associated with n/v, aural pressure and severe tinnitus. He has been diagnosed with left Meniere's disease. He has a hx of 4 Endolymphatic sac surgery in his left ear. No allergies, vision changes, weakness, or trauma.  His recent MRI (a year ago): WNLs.    Hearing test: Sharply sloping to severe SNHL left; moderate to severe SNHL right. Excellent recognition, left. Fair recognition in right. Tymps: WNLs.     Surgical History      12/31/2018 Enhance endolymphatic sac, dec sigmoid sinus, extnd facial recess apprch, mastoidectomy, bmt, combo (Right) Procedure: Right Endolymphatic Sac Enhancement, Sigmoid Sinus Decompression, Extended Facial Recess Approach, Complete Mastoidectomy, Removal Of Scar Tissue; Surgeon: Ricky Mueller MD; Location: [facility]   6/28/2012 Enhance endolymphatic sac, dec sigmoid sinus, extnd facial recess apprch, mastoidectomy, bmt, combo         Medical History         Coronary artery disease    Dyslipidemia    Gastro-oesophageal reflux disease    Hearing loss of right ear    Hypertension    Meniere's disease    Obesity    Sleep apnea         Medications    New medications from outside sources are available for reconciliation    amLODIPine (NORVASC) 5 MG tablet     aspirin (ASA) 81 MG EC tablet     atorvastatin (LIPITOR) 20 MG tablet     fish oil-omega-3 fatty acids 1000 MG capsule     hydrALAZINE (APRESOLINE) 25 MG tablet     isosorbide mononitrate (IMDUR) 30 MG 24 hr tablet     lisinopril (PRINIVIL,ZESTRIL) 40 MG tablet     metoprolol tartrate (LOPRESSOR) 100 MG tablet     Multiple Vitamin (MULTIVITAMIN OR)     pantoprazole (PROTONIX) 40 MG EC tablet     triamterene-HCTZ (DYAZIDE) 37.5-25 MG capsule     cholecalciferol 1000 UNIT TABS     MECLIZINE HCL PO     nitroglycerin  (NITROSTAT) 0.3 MG SL tablet     omeprazole (PRILOSEC) 40 MG DR capsule        Review of Systems   Constitutional: Negative.    HENT: Positive for hearing loss and tinnitus. Negative for congestion, ear discharge, ear pain, nosebleeds and sinus pain.    Eyes: Negative for blurred vision, double vision and photophobia.   Respiratory: Negative for cough, hemoptysis, sputum production and stridor.    Gastrointestinal: Positive for heartburn, nausea and vomiting.   Skin: Negative.    Neurological: Positive for dizziness. Negative for tingling, tremors and headaches.   Endo/Heme/Allergies: Negative for environmental allergies. Does not bruise/bleed easily.         Physical Exam  Vitals signs reviewed.   Constitutional:       Appearance: Normal appearance. He is normal weight.   HENT:      Head: Normocephalic and atraumatic.      Jaw: There is normal jaw occlusion.      Right Ear: Ear canal and external ear normal. Decreased hearing noted. No tenderness. No middle ear effusion. There is no impacted cerumen. Tympanic membrane is scarred.      Left Ear: Tympanic membrane, ear canal and external ear normal. Decreased hearing noted.  No middle ear effusion. There is no impacted cerumen.      Nose: No septal deviation, laceration, mucosal edema, congestion or rhinorrhea.      Right Turbinates: Not enlarged or swollen.      Left Turbinates: Not enlarged or swollen.      Mouth/Throat:      Mouth: Mucous membranes are moist.      Tongue: No lesions. Tongue does not deviate from midline.      Palate: No mass.      Pharynx: Oropharynx is clear. Uvula midline.   Eyes:      Extraocular Movements: Extraocular movements intact.      Pupils: Pupils are equal, round, and reactive to light.   Neurological:      Mental Status: He is alert.     No Spontaneous nystagmus.    Ear wax removal: He was seen in the room. Right ear canal was blocked with 100% ear wax that was removed with a curette and suctioning. Ear drum is intact with a dimeric  membrane. He tolerated the procure well.    A/P  This 70 year old patient with Meniere's disease did have two episodes of vertigo in the past several weeks; lasting around 5 hours of spinning sensation. He has been diagnosed with left Meniere's disease. He has a hx of 4 Endolymphatic sac surgery in his left ear.  Hearing test: Sharply sloping to severe SNHL left; moderate to severe SNHL right. Excellent recognition, left. Fair recognition in right. Tymps: WNLs. Options were discussed and his questions were answered.

## 2021-03-28 ENCOUNTER — HEALTH MAINTENANCE LETTER (OUTPATIENT)
Age: 71
End: 2021-03-28

## 2021-07-06 ENCOUNTER — OFFICE VISIT (OUTPATIENT)
Dept: OTOLARYNGOLOGY | Facility: CLINIC | Age: 71
End: 2021-07-06
Payer: MEDICARE

## 2021-07-06 ENCOUNTER — OFFICE VISIT (OUTPATIENT)
Dept: AUDIOLOGY | Facility: CLINIC | Age: 71
End: 2021-07-06
Payer: MEDICARE

## 2021-07-06 VITALS — DIASTOLIC BLOOD PRESSURE: 82 MMHG | OXYGEN SATURATION: 99 % | HEART RATE: 54 BPM | SYSTOLIC BLOOD PRESSURE: 156 MMHG

## 2021-07-06 DIAGNOSIS — H90.5 SNHL (SENSORINEURAL HEARING LOSS): ICD-10-CM

## 2021-07-06 DIAGNOSIS — H90.3 SNHL (SENSORY-NEURAL HEARING LOSS), ASYMMETRICAL: Primary | ICD-10-CM

## 2021-07-06 DIAGNOSIS — H81.01 MENIERE'S DISEASE, RIGHT: Primary | ICD-10-CM

## 2021-07-06 PROCEDURE — 69210 REMOVE IMPACTED EAR WAX UNI: CPT | Mod: LT | Performed by: OTOLARYNGOLOGY

## 2021-07-06 PROCEDURE — 99207 PR NO CHARGE LOS: CPT | Performed by: AUDIOLOGIST

## 2021-07-06 PROCEDURE — 99213 OFFICE O/P EST LOW 20 MIN: CPT | Mod: 25 | Performed by: OTOLARYNGOLOGY

## 2021-07-06 PROCEDURE — 92557 COMPREHENSIVE HEARING TEST: CPT | Performed by: AUDIOLOGIST

## 2021-07-06 PROCEDURE — 92567 TYMPANOMETRY: CPT | Performed by: AUDIOLOGIST

## 2021-07-06 RX ORDER — LORATADINE 10 MG/1
10 TABLET ORAL DAILY
COMMUNITY
End: 2024-07-16

## 2021-07-06 NOTE — PROGRESS NOTES
AUDIOLOGY REPORT    SUMMARY: Audiology visit completed. See audiogram for results.    RECOMMENDATIONS: Follow-up with ENT.    Juan Carlos Bryant  Doctor of Audiology  [place] License # [id number]

## 2021-07-06 NOTE — LETTER
7/6/2021         RE: Arvin Britt  [address] [place] [address]        Dear Colleague,    Thank you for referring your patient, Arvin Britt, to the [facility]. Please see a copy of my visit note below.    HPI    This 71 year old patient with Meniere's disease is here for the f/u. He is a patient of Dr. Mueller for many years. He did have one episode of vertigo in the past several months; lasting around 5 hours of spinning sensation. Associated with n/v, aural pressure and severe tinnitus. He has been diagnosed with left Meniere's disease. He has a hx of 4 Endolymphatic sac surgery in his right ear. No allergies, vision changes, weakness, or trauma.  His recent MRI (a year ago): WNLs.    Hearing test: Sharply sloping to severe SNHL left; moderate to severe SNHL right. Excellent recognition, left. Fair recognition in right. Tymps: WNLs.     Surgical History      12/31/2018 Right Endolymphatic Sac Enhancement, Sigmoid Sinus Decompression,   6/28/2012 Enhance endolymphatic sac, dec sigmoid sinus, extnd facial recess apprch, mastoidectomy, bmt, combo         Medical History         Coronary artery disease    Dyslipidemia    Gastro-oesophageal reflux disease    Hearing loss of right ear    Hypertension    Meniere's disease    Obesity    Sleep apnea         Medications    New medications from outside sources are available for reconciliation    amLODIPine (NORVASC) 5 MG tablet     aspirin (ASA) 81 MG EC tablet     atorvastatin (LIPITOR) 20 MG tablet     fish oil-omega-3 fatty acids 1000 MG capsule     hydrALAZINE (APRESOLINE) 25 MG tablet     isosorbide mononitrate (IMDUR) 30 MG 24 hr tablet     lisinopril (PRINIVIL,ZESTRIL) 40 MG tablet     metoprolol tartrate (LOPRESSOR) 100 MG tablet     Multiple Vitamin (MULTIVITAMIN OR)     pantoprazole (PROTONIX) 40 MG EC tablet     triamterene-HCTZ (DYAZIDE) 37.5-25 MG capsule     cholecalciferol 1000 UNIT TABS     MECLIZINE HCL PO      nitroglycerin (NITROSTAT) 0.3 MG SL tablet     omeprazole (PRILOSEC) 40 MG DR capsule        Review of Systems   Constitutional: Negative.    HENT: Positive for hearing loss and tinnitus. Negative for congestion, ear discharge, ear pain, nosebleeds and sinus pain.    Eyes: Negative for blurred vision, double vision and photophobia.   Respiratory: Negative for cough, hemoptysis, sputum production and stridor.    Gastrointestinal: Positive for heartburn, nausea and vomiting.   Skin: Negative.    Neurological: Positive for dizziness. Negative for tingling, tremors and headaches.   Endo/Heme/Allergies: Negative for environmental allergies. Does not bruise/bleed easily.         Physical Exam  Vitals signs reviewed.   Constitutional:       Appearance: Normal appearance. He is normal weight.   HENT:      Head: Normocephalic and atraumatic.      Jaw: There is normal jaw occlusion.      Right Ear: Ear canal and external ear normal. Decreased hearing noted. No tenderness. No middle ear effusion. There is no impacted cerumen. Tympanic membrane is scarred.      Left Ear: Tympanic membrane, ear canal and external ear normal. Decreased hearing noted.  No middle ear effusion. There is no impacted cerumen.      Nose: No septal deviation, laceration, mucosal edema, congestion or rhinorrhea.      Right Turbinates: Not enlarged or swollen.      Left Turbinates: Not enlarged or swollen.      Mouth/Throat:      Mouth: Mucous membranes are moist.      Tongue: No lesions. Tongue does not deviate from midline.      Palate: No mass.      Pharynx: Oropharynx is clear. Uvula midline.   Eyes:      Extraocular Movements: Extraocular movements intact.      Pupils: Pupils are equal, round, and reactive to light.   Neurological:      Mental Status: He is alert.     No Spontaneous nystagmus.    Ear wax removal: He was seen in the room. Left ear canal was blocked with 60% ear wax that was removed with a curette and suctioning. Ear drum is intact  with a dimeric membrane. He tolerated the procure well.    A/P  This 71 year old patient with Meniere's disease did have two episodes of vertigo in the past several weeks; lasting around 5 hours of spinning sensation. He has been diagnosed with left Meniere's disease. He has a hx of 4 Endolymphatic sac surgery in his right ear.  Hearing test: Sharply sloping to severe SNHL left; moderate to severe SNHL right. Excellent recognition, left. Fair recognition in right. Tymps: WNLs. Options were discussed and his questions were answered. F/u in 4 months.        Again, thank you for allowing me to participate in the care of your patient.        Sincerely,        Brandi Rouse MD

## 2021-07-06 NOTE — NURSING NOTE
Arvin Britt's goals for this visit include:   Chief Complaint   Patient presents with     Follow Up     Follow up Meniere's. Had a couple episodes of dizziness. then had ears cleaned in June and now better       He requests these members of his care team be copied on today's visit information: no    PCP: Gideon Hall    Referring Provider:  No referring provider defined for this encounter.    BP (!) 156/82   Pulse 54   SpO2 99%     Do you need any medication refills at today's visit? no

## 2021-07-06 NOTE — PROGRESS NOTES
HPI    This 71 year old patient with Meniere's disease is here for the f/u. He is a patient of Dr. Mueller for many years. He did have one episode of vertigo in the past several months; lasting around 5 hours of spinning sensation. Associated with n/v, aural pressure and severe tinnitus. He has been diagnosed with left Meniere's disease. He has a hx of 4 Endolymphatic sac surgery in his right ear. No allergies, vision changes, weakness, or trauma.  His recent MRI (a year ago): WNLs.    Hearing test: Sharply sloping to severe SNHL left; moderate to severe SNHL right. Excellent recognition, left. Fair recognition in right. Tymps: WNLs.     Surgical History      12/31/2018 Right Endolymphatic Sac Enhancement, Sigmoid Sinus Decompression,   6/28/2012 Enhance endolymphatic sac, dec sigmoid sinus, extnd facial recess apprch, mastoidectomy, bmt, combo         Medical History         Coronary artery disease    Dyslipidemia    Gastro-oesophageal reflux disease    Hearing loss of right ear    Hypertension    Meniere's disease    Obesity    Sleep apnea         Medications    New medications from outside sources are available for reconciliation    amLODIPine (NORVASC) 5 MG tablet     aspirin (ASA) 81 MG EC tablet     atorvastatin (LIPITOR) 20 MG tablet     fish oil-omega-3 fatty acids 1000 MG capsule     hydrALAZINE (APRESOLINE) 25 MG tablet     isosorbide mononitrate (IMDUR) 30 MG 24 hr tablet     lisinopril (PRINIVIL,ZESTRIL) 40 MG tablet     metoprolol tartrate (LOPRESSOR) 100 MG tablet     Multiple Vitamin (MULTIVITAMIN OR)     pantoprazole (PROTONIX) 40 MG EC tablet     triamterene-HCTZ (DYAZIDE) 37.5-25 MG capsule     cholecalciferol 1000 UNIT TABS     MECLIZINE HCL PO     nitroglycerin (NITROSTAT) 0.3 MG SL tablet     omeprazole (PRILOSEC) 40 MG DR capsule        Review of Systems   Constitutional: Negative.    HENT: Positive for hearing loss and tinnitus. Negative for congestion, ear discharge, ear pain, nosebleeds  and sinus pain.    Eyes: Negative for blurred vision, double vision and photophobia.   Respiratory: Negative for cough, hemoptysis, sputum production and stridor.    Gastrointestinal: Positive for heartburn, nausea and vomiting.   Skin: Negative.    Neurological: Positive for dizziness. Negative for tingling, tremors and headaches.   Endo/Heme/Allergies: Negative for environmental allergies. Does not bruise/bleed easily.         Physical Exam  Vitals signs reviewed.   Constitutional:       Appearance: Normal appearance. He is normal weight.   HENT:      Head: Normocephalic and atraumatic.      Jaw: There is normal jaw occlusion.      Right Ear: Ear canal and external ear normal. Decreased hearing noted. No tenderness. No middle ear effusion. There is no impacted cerumen. Tympanic membrane is scarred.      Left Ear: Tympanic membrane, ear canal and external ear normal. Decreased hearing noted.  No middle ear effusion. There is no impacted cerumen.      Nose: No septal deviation, laceration, mucosal edema, congestion or rhinorrhea.      Right Turbinates: Not enlarged or swollen.      Left Turbinates: Not enlarged or swollen.      Mouth/Throat:      Mouth: Mucous membranes are moist.      Tongue: No lesions. Tongue does not deviate from midline.      Palate: No mass.      Pharynx: Oropharynx is clear. Uvula midline.   Eyes:      Extraocular Movements: Extraocular movements intact.      Pupils: Pupils are equal, round, and reactive to light.   Neurological:      Mental Status: He is alert.     No Spontaneous nystagmus.    Ear wax removal: He was seen in the room. Left ear canal was blocked with 60% ear wax that was removed with a curette and suctioning. Ear drum is intact with a dimeric membrane. He tolerated the procure well.    A/P  This 71 year old patient with Meniere's disease did have two episodes of vertigo in the past several weeks; lasting around 5 hours of spinning sensation. He has been diagnosed with left  Meniere's disease. He has a hx of 4 Endolymphatic sac surgery in his right ear.  Hearing test: Sharply sloping to severe SNHL left; moderate to severe SNHL right. Excellent recognition, left. Fair recognition in right. Tymps: WNLs. Options were discussed and his questions were answered. F/u in 4 months.

## 2021-09-12 ENCOUNTER — HEALTH MAINTENANCE LETTER (OUTPATIENT)
Age: 71
End: 2021-09-12

## 2021-10-08 ENCOUNTER — OFFICE VISIT (OUTPATIENT)
Dept: OTOLARYNGOLOGY | Facility: CLINIC | Age: 71
End: 2021-10-08
Payer: MEDICARE

## 2021-10-08 VITALS — SYSTOLIC BLOOD PRESSURE: 162 MMHG | DIASTOLIC BLOOD PRESSURE: 90 MMHG | HEART RATE: 57 BPM | OXYGEN SATURATION: 98 %

## 2021-10-08 DIAGNOSIS — J30.0 CHRONIC VASOMOTOR RHINITIS: ICD-10-CM

## 2021-10-08 DIAGNOSIS — H61.21 IMPACTED CERUMEN OF RIGHT EAR: ICD-10-CM

## 2021-10-08 DIAGNOSIS — H81.10 BPPV (BENIGN PAROXYSMAL POSITIONAL VERTIGO): ICD-10-CM

## 2021-10-08 DIAGNOSIS — H81.01 MENIERE'S DISEASE, RIGHT: Primary | ICD-10-CM

## 2021-10-08 PROCEDURE — 69210 REMOVE IMPACTED EAR WAX UNI: CPT | Performed by: OTOLARYNGOLOGY

## 2021-10-08 PROCEDURE — 99215 OFFICE O/P EST HI 40 MIN: CPT | Mod: 25 | Performed by: OTOLARYNGOLOGY

## 2021-10-08 RX ORDER — IPRATROPIUM BROMIDE 21 UG/1
2 SPRAY, METERED NASAL 3 TIMES DAILY
Qty: 30 ML | Refills: 1 | Status: SHIPPED | OUTPATIENT
Start: 2021-10-08 | End: 2021-11-02

## 2021-10-08 NOTE — PROGRESS NOTES
HPI    This 71 year old patient with Meniere's disease is here for the f/u. He is a patient of Dr. Mueller for many years. He did have one episode of vertigo in the past several months; lasting around 5 hours of spinning sensation. Associated with n/v, aural pressure and severe tinnitus. Feels that he has positional dizziness with tendency to fall. He has been diagnosed with Right Meniere's disease. He has a hx of 4 Endolymphatic sac surgery in his right ear. No allergies, vision changes, weakness, or trauma.  His recent MRI (a year ago): WNLs.    Hearing test: Sharply sloping to severe SNHL left; moderate to severe SNHL right. Excellent recognition, left. Fair recognition in right. Tymps: WNLs.     Surgical History      12/31/2018 Right Endolymphatic Sac Enhancement, Sigmoid Sinus Decompression,   6/28/2012 Enhance endolymphatic sac, dec sigmoid sinus, extnd facial recess apprch, mastoidectomy, bmt, combo         Medical History         Coronary artery disease    Dyslipidemia    Gastro-oesophageal reflux disease    Hearing loss of right ear    Hypertension    Meniere's disease    Obesity    Sleep apnea         Medications        amLODIPine (NORVASC) 5 MG tablet     aspirin (ASA) 81 MG EC tablet     atorvastatin (LIPITOR) 20 MG tablet     fish oil-omega-3 fatty acids 1000 MG capsule     hydrALAZINE (APRESOLINE) 25 MG tablet     isosorbide mononitrate (IMDUR) 30 MG 24 hr tablet     lisinopril (PRINIVIL,ZESTRIL) 40 MG tablet     metoprolol tartrate (LOPRESSOR) 100 MG tablet     Multiple Vitamin (MULTIVITAMIN OR)     pantoprazole (PROTONIX) 40 MG EC tablet     triamterene-HCTZ (DYAZIDE) 37.5-25 MG capsule     cholecalciferol 1000 UNIT TABS     MECLIZINE HCL PO     nitroglycerin (NITROSTAT) 0.3 MG SL tablet     omeprazole (PRILOSEC) 40 MG DR capsule        Review of Systems   Constitutional: Negative.    HENT: Positive for hearing loss and tinnitus. Negative for congestion, ear discharge, ear pain, nosebleeds and  sinus pain.    Eyes: Negative for blurred vision, double vision and photophobia.   Respiratory: Negative for cough, hemoptysis, sputum production and stridor.    Gastrointestinal: Positive for heartburn, nausea and vomiting.   Skin: Negative.    Neurological: Positive for dizziness. Negative for tingling, tremors and headaches.   Endo/Heme/Allergies: Negative for environmental allergies. Does not bruise/bleed easily.         Physical Exam  Vitals signs reviewed.   Constitutional:       Appearance: Normal appearance. He is normal weight.   HENT:      Head: Normocephalic and atraumatic.      Jaw: There is normal jaw occlusion.      Right Ear: Ear canal and external ear normal. Decreased hearing noted. No tenderness. No middle ear effusion. There is no impacted cerumen. Tympanic membrane is scarred.      Left Ear: Tympanic membrane, ear canal and external ear normal. Decreased hearing noted.  No middle ear effusion. There is no impacted cerumen.      Nose: No septal deviation, laceration, mucosal edema, congestion or rhinorrhea.      Right Turbinates: Not enlarged or swollen.      Left Turbinates: Not enlarged or swollen.      Mouth/Throat:      Mouth: Mucous membranes are moist.      Tongue: No lesions. Tongue does not deviate from midline.      Palate: No mass.      Pharynx: Oropharynx is clear. Uvula midline.   Eyes:      Extraocular Movements: Extraocular movements intact.      Pupils: Pupils are equal, round, and reactive to light.   Neurological:      Mental Status: He is alert.     No Spontaneous nystagmus.    Ear wax removal: He was seen in the room. Right ear canal was blocked with 60% ear wax that was removed with a curette and suctioning. Ear drum is intact with a dimeric membrane. He tolerated the procedure well.    A/P  This 71 year old patient with Meniere's disease did have two episodes of vertigo in the past several weeks; lasting around 5 hours of spinning sensation. He has been diagnosed with left  Meniere's disease. He has a hx of 4 Endolymphatic sac surgery in his right ear.  Hearing test: Sharply sloping to severe SNHL left; moderate to severe SNHL right. Excellent recognition, left. Fair recognition in right. Tymps: WNLs. Options were discussed and his questions were answered. I will give him a topical Ipratropium bromide x3 and refer him to vestibular rehab for [place] [facility]. If the problem continues, IT Dexam inoculation and PE tube insertion to his right ear. F/u in 4 months or earlier as needed.

## 2021-10-08 NOTE — NURSING NOTE
Arvin Britt's goals for this visit include:   Chief Complaint   Patient presents with     RECHECK     3 month follow up meniere's disease, right, SNHL. Patient states that his balance has worsened over the past 3 weeks.        He requests these members of his care team be copied on today's visit information:     PCP: Teresa Champion    Referring Provider:  No referring provider defined for this encounter.    BP (!) 162/90 (BP Location: Right arm, Patient Position: Sitting, Cuff Size: Adult Large)   Pulse 57   SpO2 98%     Do you need any medication refills at today's visit? No  Lamar Forde CMA

## 2021-10-08 NOTE — LETTER
10/8/2021         RE: Arvin Britt  [address] [place] [address]        Dear Colleague,    Thank you for referring your patient, Arvin Britt, to the [facility]. Please see a copy of my visit note below.    HPI    This 71 year old patient with Meniere's disease is here for the f/u. He is a patient of Dr. Mueller for many years. He did have one episode of vertigo in the past several months; lasting around 5 hours of spinning sensation. Associated with n/v, aural pressure and severe tinnitus. Feels that he has positional dizziness with tendency to fall. He has been diagnosed with Right Meniere's disease. He has a hx of 4 Endolymphatic sac surgery in his right ear. No allergies, vision changes, weakness, or trauma.  His recent MRI (a year ago): WNLs.    Hearing test: Sharply sloping to severe SNHL left; moderate to severe SNHL right. Excellent recognition, left. Fair recognition in right. Tymps: WNLs.     Surgical History      12/31/2018 Right Endolymphatic Sac Enhancement, Sigmoid Sinus Decompression,   6/28/2012 Enhance endolymphatic sac, dec sigmoid sinus, extnd facial recess apprch, mastoidectomy, bmt, combo         Medical History         Coronary artery disease    Dyslipidemia    Gastro-oesophageal reflux disease    Hearing loss of right ear    Hypertension    Meniere's disease    Obesity    Sleep apnea         Medications        amLODIPine (NORVASC) 5 MG tablet     aspirin (ASA) 81 MG EC tablet     atorvastatin (LIPITOR) 20 MG tablet     fish oil-omega-3 fatty acids 1000 MG capsule     hydrALAZINE (APRESOLINE) 25 MG tablet     isosorbide mononitrate (IMDUR) 30 MG 24 hr tablet     lisinopril (PRINIVIL,ZESTRIL) 40 MG tablet     metoprolol tartrate (LOPRESSOR) 100 MG tablet     Multiple Vitamin (MULTIVITAMIN OR)     pantoprazole (PROTONIX) 40 MG EC tablet     triamterene-HCTZ (DYAZIDE) 37.5-25 MG capsule     cholecalciferol 1000 UNIT TABS     MECLIZINE HCL PO      nitroglycerin (NITROSTAT) 0.3 MG SL tablet     omeprazole (PRILOSEC) 40 MG DR capsule        Review of Systems   Constitutional: Negative.    HENT: Positive for hearing loss and tinnitus. Negative for congestion, ear discharge, ear pain, nosebleeds and sinus pain.    Eyes: Negative for blurred vision, double vision and photophobia.   Respiratory: Negative for cough, hemoptysis, sputum production and stridor.    Gastrointestinal: Positive for heartburn, nausea and vomiting.   Skin: Negative.    Neurological: Positive for dizziness. Negative for tingling, tremors and headaches.   Endo/Heme/Allergies: Negative for environmental allergies. Does not bruise/bleed easily.         Physical Exam  Vitals signs reviewed.   Constitutional:       Appearance: Normal appearance. He is normal weight.   HENT:      Head: Normocephalic and atraumatic.      Jaw: There is normal jaw occlusion.      Right Ear: Ear canal and external ear normal. Decreased hearing noted. No tenderness. No middle ear effusion. There is no impacted cerumen. Tympanic membrane is scarred.      Left Ear: Tympanic membrane, ear canal and external ear normal. Decreased hearing noted.  No middle ear effusion. There is no impacted cerumen.      Nose: No septal deviation, laceration, mucosal edema, congestion or rhinorrhea.      Right Turbinates: Not enlarged or swollen.      Left Turbinates: Not enlarged or swollen.      Mouth/Throat:      Mouth: Mucous membranes are moist.      Tongue: No lesions. Tongue does not deviate from midline.      Palate: No mass.      Pharynx: Oropharynx is clear. Uvula midline.   Eyes:      Extraocular Movements: Extraocular movements intact.      Pupils: Pupils are equal, round, and reactive to light.   Neurological:      Mental Status: He is alert.     No Spontaneous nystagmus.    Ear wax removal: He was seen in the room. Right ear canal was blocked with 60% ear wax that was removed with a curette and suctioning. Ear drum is intact  with a dimeric membrane. He tolerated the procedure well.    A/P  This 71 year old patient with Meniere's disease did have two episodes of vertigo in the past several weeks; lasting around 5 hours of spinning sensation. He has been diagnosed with left Meniere's disease. He has a hx of 4 Endolymphatic sac surgery in his right ear.  Hearing test: Sharply sloping to severe SNHL left; moderate to severe SNHL right. Excellent recognition, left. Fair recognition in right. Tymps: WNLs. Options were discussed and his questions were answered. I will give him a topical Ipratropium bromide x3 and refer him to vestibular rehab for Martin Hallpike. If the problem continues, IT Dexam inoculation and PE tube insertion to his right ear. F/u in 4 months or earlier as needed.        Again, thank you for allowing me to participate in the care of your patient.        Sincerely,        Brandi Rouse MD

## 2021-10-12 ENCOUNTER — HOSPITAL ENCOUNTER (OUTPATIENT)
Dept: PHYSICAL THERAPY | Facility: CLINIC | Age: 71
Setting detail: THERAPIES SERIES
End: 2021-10-12
Attending: OTOLARYNGOLOGY
Payer: MEDICARE

## 2021-10-12 DIAGNOSIS — H81.01 MENIERE'S DISEASE, RIGHT: ICD-10-CM

## 2021-10-12 PROCEDURE — 95992 CANALITH REPOSITIONING PROC: CPT | Mod: GP | Performed by: PHYSICAL THERAPIST

## 2021-10-12 PROCEDURE — 97161 PT EVAL LOW COMPLEX 20 MIN: CPT | Mod: GP | Performed by: PHYSICAL THERAPIST

## 2021-10-12 NOTE — PROGRESS NOTES
Rehabilitation Services        OUTPATIENT PHYSICAL THERAPY FUNCTIONAL EVALUATION  PLAN OF TREATMENT FOR OUTPATIENT REHABILITATION  (COMPLETE FOR INITIAL CLAIMS ONLY)  Patient's Last Name, First Name, M.I.  YOB: 1950  Arvin Britt  [place]     Provider's Name   Hazel Lew PT   Medical Record No.  [id number]     Start of Care Date:  10/12/21   Onset Date:  09/14/21   Type:     _X__PT   ____OT  ____SLP Medical Diagnosis:  Meniere's Right; BPPV     PT Diagnosis:  Dizziness Visits from SOC:  1                              __________________________________________________________________________________  Plan of Treatment/Functional Goals:  neuromuscular re-education, ROM, strengthening, visual perception           GOALS  IMANII  Arvin will improve his score on the Dizziness History Inventory by 10 points or greater indicating an improvement in symptoms for daily activity tolerance.  11/10/21    Looking up  Arvin will report no symptoms with looking up for wall taping and painting in his garage and working under his cars on a daily basis for 2 weeks.  11/10/21                                                                      Therapy Frequency:  1 time/week   Predicted Duration of Therapy Intervention:  30 days    Hazel Lew PT                                    I CERTIFY THE NEED FOR THESE SERVICES FURNISHED UNDER        THIS PLAN OF TREATMENT AND WHILE UNDER MY CARE     (Physician co-signature of this document indicates review and certification of the therapy plan).                Certification Date From:  10/12/21   Certification Date To:  11/10/21    Referring Provider:  Brandi Rouse MD    Initial Assessment  See Epic Evaluation- Start of Care Date: 10/12/21

## 2021-10-19 ENCOUNTER — HOSPITAL ENCOUNTER (OUTPATIENT)
Dept: PHYSICAL THERAPY | Facility: CLINIC | Age: 71
Setting detail: THERAPIES SERIES
End: 2021-10-19
Attending: OTOLARYNGOLOGY
Payer: MEDICARE

## 2021-10-19 PROCEDURE — 97112 NEUROMUSCULAR REEDUCATION: CPT | Mod: GP | Performed by: PHYSICAL THERAPIST

## 2021-10-19 PROCEDURE — 97110 THERAPEUTIC EXERCISES: CPT | Mod: GP | Performed by: PHYSICAL THERAPIST

## 2021-11-02 DIAGNOSIS — J30.0 CHRONIC VASOMOTOR RHINITIS: ICD-10-CM

## 2021-11-02 RX ORDER — IPRATROPIUM BROMIDE 21 UG/1
2 SPRAY, METERED NASAL 3 TIMES DAILY
Qty: 30 ML | Refills: 6 | Status: SHIPPED | OUTPATIENT
Start: 2021-11-02

## 2021-11-02 NOTE — TELEPHONE ENCOUNTER
Refill request for ipratropium received from [facility].  Last appointment 10/2021.  Medication refilled as requested.  Juhi Samano RN

## 2021-11-09 ENCOUNTER — OFFICE VISIT (OUTPATIENT)
Dept: OTOLARYNGOLOGY | Facility: CLINIC | Age: 71
End: 2021-11-09
Payer: MEDICARE

## 2021-11-09 VITALS — OXYGEN SATURATION: 98 % | DIASTOLIC BLOOD PRESSURE: 96 MMHG | HEART RATE: 63 BPM | SYSTOLIC BLOOD PRESSURE: 169 MMHG

## 2021-11-09 DIAGNOSIS — H81.11 BENIGN PAROXYSMAL POSITIONAL VERTIGO OF RIGHT EAR: Primary | ICD-10-CM

## 2021-11-09 PROCEDURE — 99215 OFFICE O/P EST HI 40 MIN: CPT | Performed by: OTOLARYNGOLOGY

## 2021-11-09 NOTE — LETTER
11/9/2021         RE: Arvin Britt  [address] [place] [address]        Dear Colleague,    Thank you for referring your patient, Arvin Britt, to the [facility]. Please see a copy of my visit note below.    HPI    This 71 year old patient with Meniere's disease is here for the f/u. He is a patient of Dr. Mueller for many years. I am glad to see that he has no Meniere's episode since the last visit. Feels that he has positional dizziness with tendency to fall. Describes some level of distortion in his left ear. He has been diagnosed with Right Meniere's disease. He has a hx of 4 Endolymphatic sac surgery in his right ear. No allergies, vision changes, weakness, or trauma.  His recent MRI (a year ago): WNLs.    Hearing test: Sharply sloping to severe SNHL left; moderate to severe SNHL right. Excellent recognition, left. Fair recognition in right. Tymps: WNLs.     Surgical History      12/31/2018 Right Endolymphatic Sac Enhancement, Sigmoid Sinus Decompression,   6/28/2012 Enhance endolymphatic sac, dec sigmoid sinus, extnd facial recess apprch, mastoidectomy, bmt, combo         Medical History         Coronary artery disease    Dyslipidemia    Gastro-oesophageal reflux disease    Hearing loss of right ear    Hypertension    Meniere's disease    Obesity    Sleep apnea         Medications        amLODIPine (NORVASC) 5 MG tablet     aspirin (ASA) 81 MG EC tablet     atorvastatin (LIPITOR) 20 MG tablet     fish oil-omega-3 fatty acids 1000 MG capsule     hydrALAZINE (APRESOLINE) 25 MG tablet     isosorbide mononitrate (IMDUR) 30 MG 24 hr tablet     lisinopril (PRINIVIL,ZESTRIL) 40 MG tablet     metoprolol tartrate (LOPRESSOR) 100 MG tablet     Multiple Vitamin (MULTIVITAMIN OR)     pantoprazole (PROTONIX) 40 MG EC tablet     triamterene-HCTZ (DYAZIDE) 37.5-25 MG capsule     cholecalciferol 1000 UNIT TABS     MECLIZINE HCL PO     nitroglycerin (NITROSTAT) 0.3 MG SL tablet      omeprazole (PRILOSEC) 40 MG DR capsule        Review of Systems   Constitutional: Negative.    HENT: Positive for hearing loss and tinnitus. Negative for congestion, ear discharge, ear pain, nosebleeds and sinus pain.    Eyes: Negative for blurred vision, double vision and photophobia.   Respiratory: Negative for cough, hemoptysis, sputum production and stridor.    Gastrointestinal: Positive for heartburn, nausea and vomiting.   Skin: Negative.    Neurological: Positive for dizziness. Negative for tingling, tremors and headaches.   Endo/Heme/Allergies: Negative for environmental allergies. Does not bruise/bleed easily.         Physical Exam  Vitals signs reviewed.   Constitutional:       Appearance: Normal appearance. He is normal weight.   HENT:      Head: Normocephalic and atraumatic.      Jaw: There is normal jaw occlusion.      Right Ear: Ear canal and external ear normal. Decreased hearing noted. No tenderness. No middle ear effusion. There is no impacted cerumen. Tympanic membrane is scarred.      Left Ear: Tympanic membrane, ear canal and external ear normal. Decreased hearing noted.  No middle ear effusion. There is no impacted cerumen.      Nose: No septal deviation, laceration, mucosal edema, congestion or rhinorrhea.      Right Turbinates: Not enlarged or swollen.      Left Turbinates: Not enlarged or swollen.      Mouth/Throat:      Mouth: Mucous membranes are moist.      Tongue: No lesions. Tongue does not deviate from midline.      Palate: No mass.      Pharynx: Oropharynx is clear. Uvula midline.   Eyes:      Extraocular Movements: Extraocular movements intact.      Pupils: Pupils are equal, round, and reactive to light.   Neurological:      Mental Status: He is alert.     No Spontaneous nystagmus.    Ear wax removal: He was seen in the room. Left ear canal was impacted with ear wax that was removed with a curette and suctioning. Right Ear drum is intact with a dimeric membrane. He tolerated the  procedure well.    A/P  This 71 year old patient with Meniere's disease did have two episodes of vertigo in the past several weeks; lasting around 5 hours of spinning sensation. He has been diagnosed with left Meniere's disease. He has a hx of 4 Endolymphatic sac surgery in his right ear.  Hearing test: Sharply sloping to severe SNHL left; moderate to severe SNHL right. Excellent recognition, left. Fair recognition in right. Tymps: WNLs. Options were discussed and his questions were answered. I will give him a topical Ipratropium bromide x3 and refer him to vestibular rehab for Martin Hallpike. If the problem continues, IT Dexam inoculation and PE tube insertion to his right ear. F/u in 4 months or earlier as needed.        Again, thank you for allowing me to participate in the care of your patient.        Sincerely,        Brandi Rouse MD

## 2021-11-09 NOTE — PROGRESS NOTES
HPI    This 71 year old patient with Meniere's disease is here for the f/u. He is a patient of Dr. Mueller for many years. I am glad to see that he has no Meniere's episode since the last visit. Feels that he has positional dizziness with tendency to fall. Describes some level of distortion in his left ear. He has been diagnosed with Right Meniere's disease. He has a hx of 4 Endolymphatic sac surgery in his right ear. No allergies, vision changes, weakness, or trauma.  His recent MRI (a year ago): WNLs.    Hearing test: Sharply sloping to severe SNHL left; moderate to severe SNHL right. Excellent recognition, left. Fair recognition in right. Tymps: WNLs.     Surgical History      12/31/2018 Right Endolymphatic Sac Enhancement, Sigmoid Sinus Decompression,   6/28/2012 Enhance endolymphatic sac, dec sigmoid sinus, extnd facial recess apprch, mastoidectomy, bmt, combo         Medical History         Coronary artery disease    Dyslipidemia    Gastro-oesophageal reflux disease    Hearing loss of right ear    Hypertension    Meniere's disease    Obesity    Sleep apnea         Medications        amLODIPine (NORVASC) 5 MG tablet     aspirin (ASA) 81 MG EC tablet     atorvastatin (LIPITOR) 20 MG tablet     fish oil-omega-3 fatty acids 1000 MG capsule     hydrALAZINE (APRESOLINE) 25 MG tablet     isosorbide mononitrate (IMDUR) 30 MG 24 hr tablet     lisinopril (PRINIVIL,ZESTRIL) 40 MG tablet     metoprolol tartrate (LOPRESSOR) 100 MG tablet     Multiple Vitamin (MULTIVITAMIN OR)     pantoprazole (PROTONIX) 40 MG EC tablet     triamterene-HCTZ (DYAZIDE) 37.5-25 MG capsule     cholecalciferol 1000 UNIT TABS     MECLIZINE HCL PO     nitroglycerin (NITROSTAT) 0.3 MG SL tablet     omeprazole (PRILOSEC) 40 MG DR capsule        Review of Systems   Constitutional: Negative.    HENT: Positive for hearing loss and tinnitus. Negative for congestion, ear discharge, ear pain, nosebleeds and sinus pain.    Eyes: Negative for blurred  vision, double vision and photophobia.   Respiratory: Negative for cough, hemoptysis, sputum production and stridor.    Gastrointestinal: Positive for heartburn, nausea and vomiting.   Skin: Negative.    Neurological: Positive for dizziness. Negative for tingling, tremors and headaches.   Endo/Heme/Allergies: Negative for environmental allergies. Does not bruise/bleed easily.         Physical Exam  Vitals signs reviewed.   Constitutional:       Appearance: Normal appearance. He is normal weight.   HENT:      Head: Normocephalic and atraumatic.      Jaw: There is normal jaw occlusion.      Right Ear: Ear canal and external ear normal. Decreased hearing noted. No tenderness. No middle ear effusion. There is no impacted cerumen. Tympanic membrane is scarred.      Left Ear: Tympanic membrane, ear canal and external ear normal. Decreased hearing noted.  No middle ear effusion. There is no impacted cerumen.      Nose: No septal deviation, laceration, mucosal edema, congestion or rhinorrhea.      Right Turbinates: Not enlarged or swollen.      Left Turbinates: Not enlarged or swollen.      Mouth/Throat:      Mouth: Mucous membranes are moist.      Tongue: No lesions. Tongue does not deviate from midline.      Palate: No mass.      Pharynx: Oropharynx is clear. Uvula midline.   Eyes:      Extraocular Movements: Extraocular movements intact.      Pupils: Pupils are equal, round, and reactive to light.   Neurological:      Mental Status: He is alert.     No Spontaneous nystagmus.    Ear wax removal: He was seen in the room. Left ear canal was impacted with ear wax that was removed with a curette and suctioning. Right Ear drum is intact with a dimeric membrane. He tolerated the procedure well.    A/P  This 71 year old patient with Meniere's disease did have two episodes of vertigo in the past several weeks; lasting around 5 hours of spinning sensation. He has been diagnosed with left Meniere's disease. He has a hx of 4  Endolymphatic sac surgery in his right ear.  Hearing test: Sharply sloping to severe SNHL left; moderate to severe SNHL right. Excellent recognition, left. Fair recognition in right. Tymps: WNLs. Options were discussed and his questions were answered. I will give him a topical Ipratropium bromide x3 and refer him to vestibular rehab for [place] Hallnaty. If the problem continues, IT Dexam inoculation and PE tube insertion to his right ear. F/u in 4 months or earlier as needed.

## 2021-11-09 NOTE — NURSING NOTE
Arvin Britt's goals for this visit include:   Chief Complaint   Patient presents with     Follow Up     Menier's hasn't had any dizziness, Stuggling with hearing. hx of ear impaction.        He requests these members of his care team be copied on today's visit information: yes    PCP: Teresa Champion    Referring Provider:  No referring provider defined for this encounter.    BP (!) 169/96   Pulse 63   SpO2 98%     Do you need any medication refills at today's visit? no

## 2021-11-17 ENCOUNTER — OFFICE VISIT (OUTPATIENT)
Dept: AUDIOLOGY | Facility: CLINIC | Age: 71
End: 2021-11-17
Payer: MEDICARE

## 2021-11-17 ENCOUNTER — HOSPITAL ENCOUNTER (OUTPATIENT)
Dept: PHYSICAL THERAPY | Facility: CLINIC | Age: 71
Setting detail: THERAPIES SERIES
End: 2021-11-17
Attending: OTOLARYNGOLOGY
Payer: MEDICARE

## 2021-11-17 DIAGNOSIS — H90.3 SNHL (SENSORY-NEURAL HEARING LOSS), ASYMMETRICAL: Primary | ICD-10-CM

## 2021-11-17 PROCEDURE — 99207 PR NO CHARGE LOS: CPT | Performed by: AUDIOLOGIST

## 2021-11-17 PROCEDURE — 92567 TYMPANOMETRY: CPT | Performed by: AUDIOLOGIST

## 2021-11-17 PROCEDURE — 92557 COMPREHENSIVE HEARING TEST: CPT | Performed by: AUDIOLOGIST

## 2021-11-17 PROCEDURE — 97112 NEUROMUSCULAR REEDUCATION: CPT | Mod: GP | Performed by: PHYSICAL THERAPIST

## 2021-11-17 NOTE — PROGRESS NOTES
AUDIOLOGY REPORT    SUMMARY: Audiology visit completed. See audiogram for results.    RECOMMENDATIONS: Follow-up with ENT.    Juan Carlos Bryant  Doctor of Audiology  [place] License # [id number]

## 2021-11-17 NOTE — PROGRESS NOTES
[facility]    OUTPATIENT PHYSICAL THERAPY  PLAN OF TREATMENT FOR OUTPATIENT REHABILITATION AND DISCHARGE SUMMARY           Patient's Last Name, First Name, Arvin Hassan  [place] Date of Birth  1950   Provider's Name  [facility] Medical Record No.  [id number]    Onset Date  9/14/2021 Start of Care Date  10/12/2021   Type:     _X_PT   ___OT   ___SLP Medical Diagnosis  Meniere's Right, BPPV   PT Diagnosis  Dizziness Plan of Treatment  Frequency/Duration: 1x week for 30 days  Certification date from 11/11/2021 to 12/10/2021     Goals:  Goal Identifier DHI   Goal Description Arvin will improve his score on the Dizziness History Inventory by 10 points or greater indicating an improvement in symptoms for daily activity tolerance.   Target Date 11/10/21   Date Met  11/17/21   Progress (detail required for progress note):  Goal met with 18 point improvement.     Goal Identifier Looking up   Goal Description Arvin will report no symptoms with looking up for wall taping and painting in his garage and working under his cars on a daily basis for 2 weeks.   Target Date 11/10/21   Date Met  11/17/21   Progress (detail required for progress note):  Was able to complete with 8 reps and no symptoms today       Beginning/End Dates of Progress Note Reporting Period:  10/12/2021 to 11/17/2021    Progress Toward Goals:   Progress this reporting period: Goals are now met and currently with no vertigo.  He will continue with vestibular habituation exercises for independent management of symptoms and will be leaving for [place] next month. Goals are met and can be discharged from PT at this time.    Client Self (Subjective) Report for Progress Note Reporting Period: He has lost significant hearing and made an appt to get hearing aides adjusted. Was able to look up to complete garage  project and was able to tolerate hunting but did have one episode.    Outcome Measures (Most Recent Score):    Dizziness Handicap Inventory (score out of 100) A decrease in score by 17.18 or greater indicates a clinically significant change in symptoms.: 10                                                  I CERTIFY THE NEED FOR THESE SERVICES FURNISHED UNDER        THIS PLAN OF TREATMENT AND WHILE UNDER MY CARE     (Physician co-signature of this document indicates review and certification of the therapy plan).                Referring Provider: Brandi Rouse MD Sara Dooley, PT

## 2022-04-24 ENCOUNTER — HEALTH MAINTENANCE LETTER (OUTPATIENT)
Age: 72
End: 2022-04-24

## 2022-06-08 ENCOUNTER — OFFICE VISIT (OUTPATIENT)
Dept: AUDIOLOGY | Facility: CLINIC | Age: 72
End: 2022-06-08

## 2022-06-08 ENCOUNTER — OFFICE VISIT (OUTPATIENT)
Dept: OTOLARYNGOLOGY | Facility: CLINIC | Age: 72
End: 2022-06-08
Payer: MEDICARE

## 2022-06-08 VITALS — SYSTOLIC BLOOD PRESSURE: 173 MMHG | DIASTOLIC BLOOD PRESSURE: 87 MMHG | HEART RATE: 61 BPM

## 2022-06-08 DIAGNOSIS — H90.3 SNHL (SENSORY-NEURAL HEARING LOSS), ASYMMETRICAL: Primary | ICD-10-CM

## 2022-06-08 DIAGNOSIS — H61.23 BILATERAL IMPACTED CERUMEN: ICD-10-CM

## 2022-06-08 DIAGNOSIS — H81.03 MENIERE'S DISEASE, BILATERAL: Primary | ICD-10-CM

## 2022-06-08 DIAGNOSIS — H81.03 MENIERE'S DISEASE, BILATERAL: ICD-10-CM

## 2022-06-08 PROCEDURE — 69210 REMOVE IMPACTED EAR WAX UNI: CPT | Performed by: OTOLARYNGOLOGY

## 2022-06-08 PROCEDURE — 92557 COMPREHENSIVE HEARING TEST: CPT | Performed by: AUDIOLOGIST

## 2022-06-08 PROCEDURE — 92567 TYMPANOMETRY: CPT | Performed by: AUDIOLOGIST

## 2022-06-08 PROCEDURE — 99213 OFFICE O/P EST LOW 20 MIN: CPT | Mod: 25 | Performed by: OTOLARYNGOLOGY

## 2022-06-08 PROCEDURE — 99207 PR NO CHARGE LOS: CPT | Performed by: AUDIOLOGIST

## 2022-06-08 NOTE — PROGRESS NOTES
Chief Complaint   Patient presents with     Follow Up     Ear cleaning and Audio after   HPI    This 72 year old patient with Meniere's disease is here for the f/u. I am glad to see that he has no Meniere's episode since the last visit. Feels that he has positional dizziness with tendency to fall. Describes some level of distortion in his left ear. He has been diagnosed with Right Meniere's disease. He has a hx of 4 Endolymphatic sac surgery in his right ear. No allergies, vision changes, weakness, or trauma.  His recent MRI (a year ago): WNLs.    Hearing test: Sharply sloping to severe SNHL left; moderate to severe SNHL right. Excellent recognition, left. Fair recognition in right. Tymps: WNLs.     Surgical History      12/31/2018 Right Endolymphatic Sac Enhancement, Sigmoid Sinus Decompression,   6/28/2012 Enhance endolymphatic sac, dec sigmoid sinus, extnd facial recess apprch, mastoidectomy, bmt, combo         Medical History         Coronary artery disease    Dyslipidemia    Gastro-oesophageal reflux disease    Hearing loss of right ear    Hypertension    Meniere's disease    Obesity    Sleep apnea         Medications        amLODIPine (NORVASC) 5 MG tablet     aspirin (ASA) 81 MG EC tablet     atorvastatin (LIPITOR) 20 MG tablet     fish oil-omega-3 fatty acids 1000 MG capsule     hydrALAZINE (APRESOLINE) 25 MG tablet     isosorbide mononitrate (IMDUR) 30 MG 24 hr tablet     lisinopril (PRINIVIL,ZESTRIL) 40 MG tablet     metoprolol tartrate (LOPRESSOR) 100 MG tablet     Multiple Vitamin (MULTIVITAMIN OR)     pantoprazole (PROTONIX) 40 MG EC tablet     triamterene-HCTZ (DYAZIDE) 37.5-25 MG capsule     cholecalciferol 1000 UNIT TABS     MECLIZINE HCL PO     nitroglycerin (NITROSTAT) 0.3 MG SL tablet     omeprazole (PRILOSEC) 40 MG DR capsule        Review of Systems   Constitutional: Negative.    HENT: Positive for hearing loss and tinnitus. Negative for congestion, ear discharge, ear pain, nosebleeds and sinus  pain.    Eyes: Negative for blurred vision, double vision and photophobia.   Respiratory: Negative for cough, hemoptysis, sputum production and stridor.    Gastrointestinal: Positive for heartburn, nausea and vomiting.   Skin: Negative.    Neurological: Positive for dizziness. Negative for tingling, tremors and headaches.   Endo/Heme/Allergies: Negative for environmental allergies. Does not bruise/bleed easily.         Physical Exam  Vitals signs reviewed.   Constitutional:       Appearance: Normal appearance. He is normal weight.   HENT:      Head: Normocephalic and atraumatic.      Jaw: There is normal jaw occlusion.      Right Ear: Ear canal and external ear normal. Decreased hearing noted. No tenderness. No middle ear effusion. There is no impacted cerumen. Tympanic membrane is scarred.      Left Ear: Tympanic membrane, ear canal and external ear normal. Decreased hearing noted.  No middle ear effusion. There is no impacted cerumen.      Nose: No septal deviation, laceration, mucosal edema, congestion or rhinorrhea.      Right Turbinates: Not enlarged or swollen.      Left Turbinates: Not enlarged or swollen.      Mouth/Throat:      Mouth: Mucous membranes are moist.      Tongue: No lesions. Tongue does not deviate from midline.      Palate: No mass.      Pharynx: Oropharynx is clear. Uvula midline.   Eyes:      Extraocular Movements: Extraocular movements intact.      Pupils: Pupils are equal, round, and reactive to light.   Neurological:      Mental Status: He is alert.     No Spontaneous nystagmus.    Ear wax removal: He was seen in the room. Left ear canal was impacted with ear wax that was removed with a curette and suctioning. Right Ear drum is intact with a dimeric membrane. He tolerated the procedure well.    A/P  This 72 year old patient with Meniere's disease with a hx of 4 Endolymphatic sac surgery in his right ear. F/u in 4 months or earlier as needed.

## 2022-06-08 NOTE — LETTER
6/8/2022         RE: Arvin Britt  [address] [place] [address]        Dear Colleague,    Thank you for referring your patient, Arvin Britt, to the [facility]. Please see a copy of my visit note below.    Chief Complaint   Patient presents with     Follow Up     Ear cleaning and Audio after   HPI    This 72 year old patient with Meniere's disease is here for the f/u. I am glad to see that he has no Meniere's episode since the last visit. Feels that he has positional dizziness with tendency to fall. Describes some level of distortion in his left ear. He has been diagnosed with Right Meniere's disease. He has a hx of 4 Endolymphatic sac surgery in his right ear. No allergies, vision changes, weakness, or trauma.  His recent MRI (a year ago): WNLs.    Hearing test: Sharply sloping to severe SNHL left; moderate to severe SNHL right. Excellent recognition, left. Fair recognition in right. Tymps: WNLs.     Surgical History      12/31/2018 Right Endolymphatic Sac Enhancement, Sigmoid Sinus Decompression,   6/28/2012 Enhance endolymphatic sac, dec sigmoid sinus, extnd facial recess apprch, mastoidectomy, bmt, combo         Medical History         Coronary artery disease    Dyslipidemia    Gastro-oesophageal reflux disease    Hearing loss of right ear    Hypertension    Meniere's disease    Obesity    Sleep apnea         Medications        amLODIPine (NORVASC) 5 MG tablet     aspirin (ASA) 81 MG EC tablet     atorvastatin (LIPITOR) 20 MG tablet     fish oil-omega-3 fatty acids 1000 MG capsule     hydrALAZINE (APRESOLINE) 25 MG tablet     isosorbide mononitrate (IMDUR) 30 MG 24 hr tablet     lisinopril (PRINIVIL,ZESTRIL) 40 MG tablet     metoprolol tartrate (LOPRESSOR) 100 MG tablet     Multiple Vitamin (MULTIVITAMIN OR)     pantoprazole (PROTONIX) 40 MG EC tablet     triamterene-HCTZ (DYAZIDE) 37.5-25 MG capsule     cholecalciferol 1000 UNIT TABS     MECLIZINE HCL PO      nitroglycerin (NITROSTAT) 0.3 MG SL tablet     omeprazole (PRILOSEC) 40 MG DR capsule        Review of Systems   Constitutional: Negative.    HENT: Positive for hearing loss and tinnitus. Negative for congestion, ear discharge, ear pain, nosebleeds and sinus pain.    Eyes: Negative for blurred vision, double vision and photophobia.   Respiratory: Negative for cough, hemoptysis, sputum production and stridor.    Gastrointestinal: Positive for heartburn, nausea and vomiting.   Skin: Negative.    Neurological: Positive for dizziness. Negative for tingling, tremors and headaches.   Endo/Heme/Allergies: Negative for environmental allergies. Does not bruise/bleed easily.         Physical Exam  Vitals signs reviewed.   Constitutional:       Appearance: Normal appearance. He is normal weight.   HENT:      Head: Normocephalic and atraumatic.      Jaw: There is normal jaw occlusion.      Right Ear: Ear canal and external ear normal. Decreased hearing noted. No tenderness. No middle ear effusion. There is no impacted cerumen. Tympanic membrane is scarred.      Left Ear: Tympanic membrane, ear canal and external ear normal. Decreased hearing noted.  No middle ear effusion. There is no impacted cerumen.      Nose: No septal deviation, laceration, mucosal edema, congestion or rhinorrhea.      Right Turbinates: Not enlarged or swollen.      Left Turbinates: Not enlarged or swollen.      Mouth/Throat:      Mouth: Mucous membranes are moist.      Tongue: No lesions. Tongue does not deviate from midline.      Palate: No mass.      Pharynx: Oropharynx is clear. Uvula midline.   Eyes:      Extraocular Movements: Extraocular movements intact.      Pupils: Pupils are equal, round, and reactive to light.   Neurological:      Mental Status: He is alert.     No Spontaneous nystagmus.    Ear wax removal: He was seen in the room. Left ear canal was impacted with ear wax that was removed with a curette and suctioning. Right Ear drum is intact  with a dimeric membrane. He tolerated the procedure well.    A/P  This 72 year old patient with Meniere's disease with a hx of 4 Endolymphatic sac surgery in his right ear. F/u in 4 months or earlier as needed.          Again, thank you for allowing me to participate in the care of your patient.        Sincerely,        Brandi Rouse MD

## 2022-06-08 NOTE — PROGRESS NOTES
AUDIOLOGY REPORT    SUMMARY: Audiology visit completed. See audiogram for results.    RECOMMENDATIONS: Follow-up with ENT.    Juan Carlos Bryant  Doctor of Audiology  [place] License # [id number]

## 2022-06-08 NOTE — NURSING NOTE
Arvin Britt's chief complaint for this visit includes:  Chief Complaint   Patient presents with     Follow Up     Ear cleaning and Audio after     PCP: Teresa Champion    Referring Provider:  No referring provider defined for this encounter.    BP (!) 173/87   Pulse 61   Data Unavailable        Allergies   Allergen Reactions     Adhesive Tape      REDNESS     Amoxicillin      Other reaction(s): GI Upset, Stomach Upset  Augmentin--severe stomach upset  Augmentin--severe stomach upset  Augmentin--severe stomach upset  Augmentin--severe stomach upset       Felodipine Other (See Comments)     Flushing and hot flashes     Lortab [Hydrocodone-Acetaminophen]      AGITATION,  NIGHTMARES     Oxycodone Other (See Comments)     Patient states he got wired from it          Do you need any medication refills at today's visit?

## 2022-06-14 ENCOUNTER — LAB (OUTPATIENT)
Dept: LAB | Facility: CLINIC | Age: 72
End: 2022-06-14
Payer: MEDICARE

## 2022-06-14 ENCOUNTER — TELEPHONE (OUTPATIENT)
Dept: OTOLARYNGOLOGY | Facility: CLINIC | Age: 72
End: 2022-06-14

## 2022-06-14 DIAGNOSIS — H81.03 MENIERE'S DISEASE, BILATERAL: ICD-10-CM

## 2022-06-14 LAB
ALBUMIN SERPL-MCNC: 3.7 G/DL (ref 3.4–5)
ALP SERPL-CCNC: 97 U/L (ref 40–150)
ALT SERPL W P-5'-P-CCNC: 31 U/L (ref 0–70)
ANION GAP SERPL CALCULATED.3IONS-SCNC: 6 MMOL/L (ref 3–14)
AST SERPL W P-5'-P-CCNC: 22 U/L (ref 0–45)
BILIRUB SERPL-MCNC: 0.9 MG/DL (ref 0.2–1.3)
BUN SERPL-MCNC: 16 MG/DL (ref 7–30)
CALCIUM SERPL-MCNC: 8.6 MG/DL (ref 8.5–10.1)
CHLORIDE BLD-SCNC: 102 MMOL/L (ref 94–109)
CO2 SERPL-SCNC: 29 MMOL/L (ref 20–32)
CREAT SERPL-MCNC: 0.74 MG/DL (ref 0.66–1.25)
GFR SERPL CREATININE-BSD FRML MDRD: >90 ML/MIN/1.73M2
GLUCOSE BLD-MCNC: 89 MG/DL (ref 70–99)
POTASSIUM BLD-SCNC: 3.3 MMOL/L (ref 3.4–5.3)
PROT SERPL-MCNC: 6.7 G/DL (ref 6.8–8.8)
SODIUM SERPL-SCNC: 137 MMOL/L (ref 133–144)

## 2022-06-14 PROCEDURE — 80053 COMPREHEN METABOLIC PANEL: CPT

## 2022-06-14 PROCEDURE — 36415 COLL VENOUS BLD VENIPUNCTURE: CPT

## 2022-06-14 NOTE — TELEPHONE ENCOUNTER
Phone call to pt with lab results, Dr Rouse's recommendation to increase intake of potassium-rich foods. Pt verbalized understanding of plan. Juhi Samano RN

## 2022-06-14 NOTE — TELEPHONE ENCOUNTER
----- Message from Brandi Rouse MD sent at 6/14/2022  9:02 AM CDT -----  Arvin's potassium level is slightly low ( at the borderline). I would recommend that he can eat fruits and vegetables that are rich in potassium: Bananas, oranges, cantaloupe, grapefruit (some dried fruits, such as prunes, raisins, and dates, are also high in potassium), spinach, lentil and beans.

## 2022-08-17 ENCOUNTER — TRANSFERRED RECORDS (OUTPATIENT)
Dept: HEALTH INFORMATION MANAGEMENT | Facility: CLINIC | Age: 72
End: 2022-08-17

## 2022-10-07 ENCOUNTER — OFFICE VISIT (OUTPATIENT)
Dept: OTOLARYNGOLOGY | Facility: CLINIC | Age: 72
End: 2022-10-07
Payer: MEDICARE

## 2022-10-07 VITALS — HEART RATE: 66 BPM | SYSTOLIC BLOOD PRESSURE: 155 MMHG | DIASTOLIC BLOOD PRESSURE: 87 MMHG

## 2022-10-07 DIAGNOSIS — H90.3 SENSORINEURAL HEARING LOSS (SNHL) OF BOTH EARS: ICD-10-CM

## 2022-10-07 DIAGNOSIS — H81.03 MENIERE'S DISEASE, BILATERAL: Primary | ICD-10-CM

## 2022-10-07 DIAGNOSIS — H61.23 BILATERAL IMPACTED CERUMEN: ICD-10-CM

## 2022-10-07 DIAGNOSIS — H81.01 MENIERE'S DISEASE, RIGHT: ICD-10-CM

## 2022-10-07 PROCEDURE — 99214 OFFICE O/P EST MOD 30 MIN: CPT | Mod: 25 | Performed by: OTOLARYNGOLOGY

## 2022-10-07 PROCEDURE — 69210 REMOVE IMPACTED EAR WAX UNI: CPT | Performed by: OTOLARYNGOLOGY

## 2022-10-07 NOTE — PROGRESS NOTES
Chief Complaint   Patient presents with     Follow Up     Ear cleaning. Was at [facility] for hearing aid, and noticed a lesion in the left ear and wax in the right.      Chief Complaint   Patient presents with     Follow Up     Ear cleaning and Audio after   HPI    This 72 year old patient with Meniere's disease is here for the f/u. I am glad to see that he has no Meniere's episode since the last visit. Feels that he has positional dizziness with tendency to fall. Describes some level of distortion in his left ear. He has been diagnosed with Right Meniere's disease. He has a hx of 4 Endolymphatic sac surgery in his right ear. No allergies, vision changes, weakness, or trauma.  His recent MRI (a year ago): WNLs.    Hearing test: Sharply sloping to severe SNHL left; moderate to severe SNHL right. Excellent recognition, left. Fair recognition in right. Tymps: WNLs.     Surgical History      12/31/2018 Right Endolymphatic Sac Enhancement, Sigmoid Sinus Decompression,   6/28/2012 Enhance endolymphatic sac, dec sigmoid sinus, extnd facial recess apprch, mastoidectomy, bmt, combo         Medical History         Coronary artery disease    Dyslipidemia    Gastro-oesophageal reflux disease    Hearing loss of right ear    Hypertension    Meniere's disease    Obesity    Sleep apnea         Medications        amLODIPine (NORVASC) 5 MG tablet     aspirin (ASA) 81 MG EC tablet     atorvastatin (LIPITOR) 20 MG tablet     fish oil-omega-3 fatty acids 1000 MG capsule     hydrALAZINE (APRESOLINE) 25 MG tablet     isosorbide mononitrate (IMDUR) 30 MG 24 hr tablet     lisinopril (PRINIVIL,ZESTRIL) 40 MG tablet     metoprolol tartrate (LOPRESSOR) 100 MG tablet     Multiple Vitamin (MULTIVITAMIN OR)     pantoprazole (PROTONIX) 40 MG EC tablet     triamterene-HCTZ (DYAZIDE) 37.5-25 MG capsule     cholecalciferol 1000 UNIT TABS     MECLIZINE HCL PO     nitroglycerin (NITROSTAT) 0.3 MG SL tablet     omeprazole (PRILOSEC) 40 MG DR capsule         Review of Systems   Constitutional: Negative.    HENT: Positive for hearing loss and tinnitus. Negative for congestion, ear discharge, ear pain, nosebleeds and sinus pain.    Eyes: Negative for blurred vision, double vision and photophobia.   Respiratory: Negative for cough, hemoptysis, sputum production and stridor.    Gastrointestinal: Positive for heartburn, nausea and vomiting.   Skin: Negative.    Neurological: Positive for dizziness. Negative for tingling, tremors and headaches.   Endo/Heme/Allergies: Negative for environmental allergies. Does not bruise/bleed easily.         Physical Exam  Vitals signs reviewed.   Constitutional:       Appearance: Normal appearance. He is normal weight.   HENT:      Head: Normocephalic and atraumatic.      Jaw: There is normal jaw occlusion.      Right Ear: Ear canal and external ear normal. Decreased hearing noted. No tenderness. No middle ear effusion. There is no impacted cerumen. Tympanic membrane is scarred.      Left Ear: Tympanic membrane, ear canal and external ear normal. Decreased hearing noted.  No middle ear effusion. There is no impacted cerumen.      Nose: No septal deviation, laceration, mucosal edema, congestion or rhinorrhea.      Right Turbinates: Not enlarged or swollen.      Left Turbinates: Not enlarged or swollen.      Mouth/Throat:      Mouth: Mucous membranes are moist.      Tongue: No lesions. Tongue does not deviate from midline.      Palate: No mass.      Pharynx: Oropharynx is clear. Uvula midline.   Eyes:      Extraocular Movements: Extraocular movements intact.      Pupils: Pupils are equal, round, and reactive to light.   Neurological:      Mental Status: He is alert.     No Spontaneous nystagmus.    Ear wax removal: He was seen in the room. Left ear canal was impacted with ear wax that was removed with a curette and suctioning. Right Ear drum is intact with a dimeric membrane. He tolerated the procedure well.    A/P  This 72 year old  patient with Meniere's disease with a hx of 4 Endolymphatic sac surgery in his right ear. F/u in 4 months or earlier as needed.

## 2022-10-07 NOTE — NURSING NOTE
Arvin Britt's chief complaint for this visit includes:  Chief Complaint   Patient presents with     Follow Up     Ear cleaning. Was at [facility] for hearing aid, and noticed a lesion in the left ear and wax in the right.      PCP: Teresa Champion    Referring Provider:  No referring provider defined for this encounter.    BP (!) 155/87   Pulse 66   Data Unavailable        Allergies   Allergen Reactions     Adhesive Tape      REDNESS     Amoxicillin      Other reaction(s): GI Upset, Stomach Upset  Augmentin--severe stomach upset  Augmentin--severe stomach upset  Augmentin--severe stomach upset  Augmentin--severe stomach upset       Felodipine Other (See Comments)     Flushing and hot flashes     Lortab [Hydrocodone-Acetaminophen]      AGITATION,  NIGHTMARES     Oxycodone Other (See Comments)     Patient states he got wired from it          Do you need any medication refills at today's visit?

## 2022-10-07 NOTE — LETTER
10/7/2022         RE: Arvin Britt  [address] [place] [address]        Dear Colleague,    Thank you for referring your patient, Arvin Britt, to the [facility]. Please see a copy of my visit note below.    Chief Complaint   Patient presents with     Follow Up     Ear cleaning. Was at [facility] for hearing aid, and noticed a lesion in the left ear and wax in the right.      Chief Complaint   Patient presents with     Follow Up     Ear cleaning and Audio after   HPI    This 72 year old patient with Meniere's disease is here for the f/u. I am glad to see that he has no Meniere's episode since the last visit. Feels that he has positional dizziness with tendency to fall. Describes some level of distortion in his left ear. He has been diagnosed with Right Meniere's disease. He has a hx of 4 Endolymphatic sac surgery in his right ear. No allergies, vision changes, weakness, or trauma.  His recent MRI (a year ago): WNLs.    Hearing test: Sharply sloping to severe SNHL left; moderate to severe SNHL right. Excellent recognition, left. Fair recognition in right. Tymps: WNLs.     Surgical History      12/31/2018 Right Endolymphatic Sac Enhancement, Sigmoid Sinus Decompression,   6/28/2012 Enhance endolymphatic sac, dec sigmoid sinus, extnd facial recess apprch, mastoidectomy, bmt, combo         Medical History         Coronary artery disease    Dyslipidemia    Gastro-oesophageal reflux disease    Hearing loss of right ear    Hypertension    Meniere's disease    Obesity    Sleep apnea         Medications        amLODIPine (NORVASC) 5 MG tablet     aspirin (ASA) 81 MG EC tablet     atorvastatin (LIPITOR) 20 MG tablet     fish oil-omega-3 fatty acids 1000 MG capsule     hydrALAZINE (APRESOLINE) 25 MG tablet     isosorbide mononitrate (IMDUR) 30 MG 24 hr tablet     lisinopril (PRINIVIL,ZESTRIL) 40 MG tablet     metoprolol tartrate (LOPRESSOR) 100 MG tablet     Multiple Vitamin  (MULTIVITAMIN OR)     pantoprazole (PROTONIX) 40 MG EC tablet     triamterene-HCTZ (DYAZIDE) 37.5-25 MG capsule     cholecalciferol 1000 UNIT TABS     MECLIZINE HCL PO     nitroglycerin (NITROSTAT) 0.3 MG SL tablet     omeprazole (PRILOSEC) 40 MG DR capsule        Review of Systems   Constitutional: Negative.    HENT: Positive for hearing loss and tinnitus. Negative for congestion, ear discharge, ear pain, nosebleeds and sinus pain.    Eyes: Negative for blurred vision, double vision and photophobia.   Respiratory: Negative for cough, hemoptysis, sputum production and stridor.    Gastrointestinal: Positive for heartburn, nausea and vomiting.   Skin: Negative.    Neurological: Positive for dizziness. Negative for tingling, tremors and headaches.   Endo/Heme/Allergies: Negative for environmental allergies. Does not bruise/bleed easily.         Physical Exam  Vitals signs reviewed.   Constitutional:       Appearance: Normal appearance. He is normal weight.   HENT:      Head: Normocephalic and atraumatic.      Jaw: There is normal jaw occlusion.      Right Ear: Ear canal and external ear normal. Decreased hearing noted. No tenderness. No middle ear effusion. There is no impacted cerumen. Tympanic membrane is scarred.      Left Ear: Tympanic membrane, ear canal and external ear normal. Decreased hearing noted.  No middle ear effusion. There is no impacted cerumen.      Nose: No septal deviation, laceration, mucosal edema, congestion or rhinorrhea.      Right Turbinates: Not enlarged or swollen.      Left Turbinates: Not enlarged or swollen.      Mouth/Throat:      Mouth: Mucous membranes are moist.      Tongue: No lesions. Tongue does not deviate from midline.      Palate: No mass.      Pharynx: Oropharynx is clear. Uvula midline.   Eyes:      Extraocular Movements: Extraocular movements intact.      Pupils: Pupils are equal, round, and reactive to light.   Neurological:      Mental Status: He is alert.     No  Spontaneous nystagmus.    Ear wax removal: He was seen in the room. Left ear canal was impacted with ear wax that was removed with a curette and suctioning. Right Ear drum is intact with a dimeric membrane. He tolerated the procedure well.    A/P  This 72 year old patient with Meniere's disease with a hx of 4 Endolymphatic sac surgery in his right ear. F/u in 4 months or earlier as needed.            Again, thank you for allowing me to participate in the care of your patient.        Sincerely,        Brandi Rouse MD

## 2023-04-26 ENCOUNTER — OFFICE VISIT (OUTPATIENT)
Dept: OTOLARYNGOLOGY | Facility: CLINIC | Age: 73
End: 2023-04-26
Payer: MEDICARE

## 2023-04-26 VITALS — SYSTOLIC BLOOD PRESSURE: 142 MMHG | HEART RATE: 58 BPM | DIASTOLIC BLOOD PRESSURE: 81 MMHG

## 2023-04-26 DIAGNOSIS — H81.03 MENIERE'S DISEASE, BILATERAL: Primary | ICD-10-CM

## 2023-04-26 DIAGNOSIS — H61.23 BILATERAL IMPACTED CERUMEN: ICD-10-CM

## 2023-04-26 PROCEDURE — 99214 OFFICE O/P EST MOD 30 MIN: CPT | Mod: 25 | Performed by: OTOLARYNGOLOGY

## 2023-04-26 PROCEDURE — 69210 REMOVE IMPACTED EAR WAX UNI: CPT | Mod: LT | Performed by: OTOLARYNGOLOGY

## 2023-04-26 NOTE — PROGRESS NOTES
Arvin Britt's chief complaint for this visit includes:  Chief Complaint   Patient presents with     Follow Up     Ear cleaning. Hx of Menier's. Had last episode was in Dec.      PCP: Teresa Champion    Referring Provider:  No referring provider defined for this encounter.    BP (!) 142/81   Pulse 58     Chief Complaint   Patient presents with     Follow Up     Ear cleaning. Was at [facility] for hearing aid, and noticed a lesion in the left ear and wax in the right.      Chief Complaint   Patient presents with     Follow Up     Ear cleaning and Audio after   HPI    This 73 year old patient with Meniere's disease is here for the f/u. I am glad to see that he has no Meniere's episode since the last visit. He has been diagnosed with Right Meniere's disease. He has a hx of 4 Endolymphatic sac surgery in his right ear. No allergies, vision changes, weakness, or trauma.  His recent MRI (a year ago): WNLs.    Hearing test: Sharply sloping to severe SNHL left; moderate to severe SNHL right. Excellent recognition, left. Fair recognition in right. Tymps: WNLs.     Surgical History      12/31/2018 Right Endolymphatic Sac Enhancement, Sigmoid Sinus Decompression,   6/28/2012 Enhance endolymphatic sac, dec sigmoid sinus, extnd facial recess apprch, mastoidectomy, bmt, combo         Medical History         Coronary artery disease    Dyslipidemia    Gastro-oesophageal reflux disease    Hearing loss of right ear    Hypertension    Meniere's disease    Obesity    Sleep apnea         Medications        amLODIPine (NORVASC) 5 MG tablet     aspirin (ASA) 81 MG EC tablet     atorvastatin (LIPITOR) 20 MG tablet     fish oil-omega-3 fatty acids 1000 MG capsule     hydrALAZINE (APRESOLINE) 25 MG tablet     isosorbide mononitrate (IMDUR) 30 MG 24 hr tablet     lisinopril (PRINIVIL,ZESTRIL) 40 MG tablet     metoprolol tartrate (LOPRESSOR) 100 MG tablet     Multiple Vitamin (MULTIVITAMIN OR)     pantoprazole (PROTONIX) 40 MG EC tablet      triamterene-HCTZ (DYAZIDE) 37.5-25 MG capsule     cholecalciferol 1000 UNIT TABS     MECLIZINE HCL PO     nitroglycerin (NITROSTAT) 0.3 MG SL tablet     omeprazole (PRILOSEC) 40 MG DR capsule        Review of Systems   Constitutional: Negative.    HENT: Positive for hearing loss and tinnitus. Negative for congestion, ear discharge, ear pain, nosebleeds and sinus pain.    Eyes: Negative for blurred vision, double vision and photophobia.   Respiratory: Negative for cough, hemoptysis, sputum production and stridor.    Gastrointestinal: Positive for heartburn, nausea and vomiting.   Skin: Negative.    Neurological: Positive for dizziness. Negative for tingling, tremors and headaches.   Endo/Heme/Allergies: Negative for environmental allergies. Does not bruise/bleed easily.       Physical Exam  Vitals signs reviewed.   Constitutional:       Appearance: Normal appearance. He is normal weight.   HENT:      Head: Normocephalic and atraumatic.      Jaw: There is normal jaw occlusion.      Right Ear: Ear canal and external ear normal. Decreased hearing noted. No tenderness. No middle ear effusion. There is no impacted cerumen. Tympanic membrane is scarred.      Left Ear: Tympanic membrane, ear canal and external ear normal. Decreased hearing noted.  No middle ear effusion. There is no impacted cerumen.      No Spontaneous nystagmus.    Ear wax removal: He was seen in the room. Left ear canal was impacted with ear wax that was removed with a curette and suctioning. Right Ear drum is intact with a dimeric membrane. He tolerated the procedure well.    A/P  This 73 year old patient with Meniere's disease with a hx of 4 Endolymphatic sac surgery in his right ear. F/u in 6 months or earlier as needed.

## 2023-04-26 NOTE — LETTER
4/26/2023         RE: Arvin Britt  [address] [place] [address]        Dear Colleague,    Thank you for referring your patient, Arvin Britt, to the [facility]. Please see a copy of my visit note below.    Arvin Britt's chief complaint for this visit includes:  Chief Complaint   Patient presents with     Follow Up     Ear cleaning. Hx of Menier's. Had last episode was in Dec.      PCP: Teresa Champion    Referring Provider:  No referring provider defined for this encounter.    BP (!) 142/81   Pulse 58     Chief Complaint   Patient presents with     Follow Up     Ear cleaning. Was at [facility] for hearing aid, and noticed a lesion in the left ear and wax in the right.      Chief Complaint   Patient presents with     Follow Up     Ear cleaning and Audio after   HPI    This 73 year old patient with Meniere's disease is here for the f/u. I am glad to see that he has no Meniere's episode since the last visit. He has been diagnosed with Right Meniere's disease. He has a hx of 4 Endolymphatic sac surgery in his right ear. No allergies, vision changes, weakness, or trauma.  His recent MRI (a year ago): WNLs.    Hearing test: Sharply sloping to severe SNHL left; moderate to severe SNHL right. Excellent recognition, left. Fair recognition in right. Tymps: WNLs.     Surgical History      12/31/2018 Right Endolymphatic Sac Enhancement, Sigmoid Sinus Decompression,   6/28/2012 Enhance endolymphatic sac, dec sigmoid sinus, extnd facial recess apprch, mastoidectomy, bmt, combo         Medical History         Coronary artery disease    Dyslipidemia    Gastro-oesophageal reflux disease    Hearing loss of right ear    Hypertension    Meniere's disease    Obesity    Sleep apnea         Medications        amLODIPine (NORVASC) 5 MG tablet     aspirin (ASA) 81 MG EC tablet     atorvastatin (LIPITOR) 20 MG tablet     fish oil-omega-3 fatty acids 1000 MG capsule     hydrALAZINE  (APRESOLINE) 25 MG tablet     isosorbide mononitrate (IMDUR) 30 MG 24 hr tablet     lisinopril (PRINIVIL,ZESTRIL) 40 MG tablet     metoprolol tartrate (LOPRESSOR) 100 MG tablet     Multiple Vitamin (MULTIVITAMIN OR)     pantoprazole (PROTONIX) 40 MG EC tablet     triamterene-HCTZ (DYAZIDE) 37.5-25 MG capsule     cholecalciferol 1000 UNIT TABS     MECLIZINE HCL PO     nitroglycerin (NITROSTAT) 0.3 MG SL tablet     omeprazole (PRILOSEC) 40 MG DR capsule        Review of Systems   Constitutional: Negative.    HENT: Positive for hearing loss and tinnitus. Negative for congestion, ear discharge, ear pain, nosebleeds and sinus pain.    Eyes: Negative for blurred vision, double vision and photophobia.   Respiratory: Negative for cough, hemoptysis, sputum production and stridor.    Gastrointestinal: Positive for heartburn, nausea and vomiting.   Skin: Negative.    Neurological: Positive for dizziness. Negative for tingling, tremors and headaches.   Endo/Heme/Allergies: Negative for environmental allergies. Does not bruise/bleed easily.       Physical Exam  Vitals signs reviewed.   Constitutional:       Appearance: Normal appearance. He is normal weight.   HENT:      Head: Normocephalic and atraumatic.      Jaw: There is normal jaw occlusion.      Right Ear: Ear canal and external ear normal. Decreased hearing noted. No tenderness. No middle ear effusion. There is no impacted cerumen. Tympanic membrane is scarred.      Left Ear: Tympanic membrane, ear canal and external ear normal. Decreased hearing noted.  No middle ear effusion. There is no impacted cerumen.      No Spontaneous nystagmus.    Ear wax removal: He was seen in the room. Left ear canal was impacted with ear wax that was removed with a curette and suctioning. Right Ear drum is intact with a dimeric membrane. He tolerated the procedure well.    A/P  This 73 year old patient with Meniere's disease with a hx of 4 Endolymphatic sac surgery in his right ear. F/u in 6  months or earlier as needed.            Again, thank you for allowing me to participate in the care of your patient.        Sincerely,        Brandi Rouse MD

## 2023-04-26 NOTE — NURSING NOTE
Arvin Britt's chief complaint for this visit includes:  Chief Complaint   Patient presents with     Follow Up     Ear cleaning. Hx of Menier's. Had last episode was in Dec.      PCP: Teresa Champion    Referring Provider:  No referring provider defined for this encounter.    BP (!) 142/81   Pulse 58

## 2023-06-01 ENCOUNTER — HEALTH MAINTENANCE LETTER (OUTPATIENT)
Age: 73
End: 2023-06-01

## 2023-12-06 ENCOUNTER — OFFICE VISIT (OUTPATIENT)
Dept: AUDIOLOGY | Facility: CLINIC | Age: 73
End: 2023-12-06
Payer: MEDICARE

## 2023-12-06 ENCOUNTER — OFFICE VISIT (OUTPATIENT)
Dept: OTOLARYNGOLOGY | Facility: CLINIC | Age: 73
End: 2023-12-06
Payer: MEDICARE

## 2023-12-06 VITALS — HEART RATE: 65 BPM | SYSTOLIC BLOOD PRESSURE: 126 MMHG | DIASTOLIC BLOOD PRESSURE: 76 MMHG

## 2023-12-06 DIAGNOSIS — H81.03 MENIERE'S DISEASE, BILATERAL: ICD-10-CM

## 2023-12-06 DIAGNOSIS — H61.23 BILATERAL IMPACTED CERUMEN: Primary | ICD-10-CM

## 2023-12-06 DIAGNOSIS — H90.3 SENSORINEURAL HEARING LOSS (SNHL) OF BOTH EARS: ICD-10-CM

## 2023-12-06 DIAGNOSIS — H90.3 SNHL (SENSORY-NEURAL HEARING LOSS), ASYMMETRICAL: Primary | ICD-10-CM

## 2023-12-06 PROCEDURE — 99214 OFFICE O/P EST MOD 30 MIN: CPT | Mod: 25 | Performed by: OTOLARYNGOLOGY

## 2023-12-06 PROCEDURE — 69210 REMOVE IMPACTED EAR WAX UNI: CPT | Mod: LT | Performed by: OTOLARYNGOLOGY

## 2023-12-06 PROCEDURE — 92557 COMPREHENSIVE HEARING TEST: CPT | Performed by: AUDIOLOGIST

## 2023-12-06 PROCEDURE — 92567 TYMPANOMETRY: CPT | Performed by: AUDIOLOGIST

## 2023-12-06 NOTE — PROGRESS NOTES
AUDIOLOGY REPORT    SUMMARY: Audiology visit completed. See audiogram for results.    RECOMMENDATIONS: Follow-up with ENT.    Juan Carlos Bryant  Doctor of Audiology  [place] License # [id number]

## 2023-12-06 NOTE — LETTER
12/6/2023         RE: Arvin Britt  [address] [place] [address]        Dear Colleague,    Thank you for referring your patient, Arvin Britt, to the [facility]. Please see a copy of my visit note below.    Arvin Britt's chief complaint for this visit includes:  Chief Complaint   Patient presents with     Follow Up     Ear Cleaning - possible Audiogram     PCP: Teresa Champion    Referring Provider:  No referring provider defined for this encounter.    /76 (BP Location: Right arm, Patient Position: Sitting, Cuff Size: Adult Large)   Pulse 65     Cassy Pham MA on 12/6/2023 at 10:31 AM      HPI    This 73 year old patient with Meniere's disease is here for the f/u. I am glad to see that he has no Meniere's episode since the last visit. He has been diagnosed with Right Meniere's disease. He has a hx of 4 Endolymphatic sac surgery in his right ear. No allergies, vision changes, weakness, or trauma.  His recent MRI (a year ago): WNLs.    Hearing test: Sharply sloping to severe SNHL left; moderate to severe SNHL right. Excellent recognition, left. Fair recognition in right. Tymps: WNLs.     Surgical History      12/31/2018 Right Endolymphatic Sac Enhancement, Sigmoid Sinus Decompression,   6/28/2012 Enhance endolymphatic sac, dec sigmoid sinus, extnd facial recess apprch, mastoidectomy, bmt, combo         Medical History         Coronary artery disease    Dyslipidemia    Gastro-oesophageal reflux disease    Hearing loss of right ear    Hypertension    Meniere's disease    Obesity    Sleep apnea         Medications        amLODIPine (NORVASC) 5 MG tablet     aspirin (ASA) 81 MG EC tablet     atorvastatin (LIPITOR) 20 MG tablet     fish oil-omega-3 fatty acids 1000 MG capsule     hydrALAZINE (APRESOLINE) 25 MG tablet     isosorbide mononitrate (IMDUR) 30 MG 24 hr tablet     lisinopril (PRINIVIL,ZESTRIL) 40 MG tablet     metoprolol tartrate (LOPRESSOR) 100 MG  tablet     Multiple Vitamin (MULTIVITAMIN OR)     pantoprazole (PROTONIX) 40 MG EC tablet     triamterene-HCTZ (DYAZIDE) 37.5-25 MG capsule     cholecalciferol 1000 UNIT TABS     MECLIZINE HCL PO     nitroglycerin (NITROSTAT) 0.3 MG SL tablet     omeprazole (PRILOSEC) 40 MG DR capsule        Review of Systems   Constitutional: Negative.    HENT: Positive for hearing loss and tinnitus. Negative for congestion, ear discharge, ear pain, nosebleeds and sinus pain.    Eyes: Negative for blurred vision, double vision and photophobia.   Respiratory: Negative for cough, hemoptysis, sputum production and stridor.    Gastrointestinal: Positive for heartburn, nausea and vomiting.   Skin: Negative.    Neurological: Positive for dizziness. Negative for tingling, tremors and headaches.   Endo/Heme/Allergies: Negative for environmental allergies. Does not bruise/bleed easily.       Physical Exam  Vitals signs reviewed.   Constitutional:       Appearance: Normal appearance. He is normal weight.   HENT:      Head: Normocephalic and atraumatic.      Jaw: There is normal jaw occlusion.      Right Ear: Ear canal and external ear normal. Decreased hearing noted. No tenderness. No middle ear effusion. There is no impacted cerumen. Tympanic membrane is scarred.      Left Ear: Tympanic membrane, ear canal and external ear normal. Decreased hearing noted.  No middle ear effusion. There is no impacted cerumen.      No Spontaneous nystagmus.    Ear wax removal: He was seen in the room. Left ear canal was impacted with ear wax that was removed with a curette and suctioning. Right Ear drum is intact with a dimeric membrane. He tolerated the procedure well.    A/P  This 73 year old patient with Meniere's disease with a hx of 4 Endolymphatic sac surgery in his right ear. F/u in 6 months or earlier as needed.            Again, thank you for allowing me to participate in the care of your patient.        Sincerely,        Brandi Rouse MD

## 2023-12-06 NOTE — NURSING NOTE
Arvin Britt's chief complaint for this visit includes:  Chief Complaint   Patient presents with    Follow Up     Ear Cleaning - possible Audiogram     PCP: Teresa Champoin    Referring Provider:  No referring provider defined for this encounter.    /76 (BP Location: Right arm, Patient Position: Sitting, Cuff Size: Adult Large)   Pulse 65

## 2023-12-06 NOTE — PROGRESS NOTES
Arvin Britt's chief complaint for this visit includes:  Chief Complaint   Patient presents with    Follow Up     Ear Cleaning - possible Audiogram     PCP: Teresa Champion    Referring Provider:  No referring provider defined for this encounter.    /76 (BP Location: Right arm, Patient Position: Sitting, Cuff Size: Adult Large)   Pulse 65     Cassy Pham MA on 12/6/2023 at 10:31 AM      HPI    This 73 year old patient with Meniere's disease is here for the f/u. I am glad to see that he has no Meniere's episode since the last visit. He has been diagnosed with Right Meniere's disease. He has a hx of 4 Endolymphatic sac surgery in his right ear. No allergies, vision changes, weakness, or trauma.  His recent MRI (a year ago): WNLs.    Hearing test: Sharply sloping to severe SNHL left; moderate to severe SNHL right. Excellent recognition, left. Fair recognition in right. Tymps: WNLs.     Surgical History      12/31/2018 Right Endolymphatic Sac Enhancement, Sigmoid Sinus Decompression,   6/28/2012 Enhance endolymphatic sac, dec sigmoid sinus, extnd facial recess apprch, mastoidectomy, bmt, combo         Medical History         Coronary artery disease    Dyslipidemia    Gastro-oesophageal reflux disease    Hearing loss of right ear    Hypertension    Meniere's disease    Obesity    Sleep apnea         Medications        amLODIPine (NORVASC) 5 MG tablet     aspirin (ASA) 81 MG EC tablet     atorvastatin (LIPITOR) 20 MG tablet     fish oil-omega-3 fatty acids 1000 MG capsule     hydrALAZINE (APRESOLINE) 25 MG tablet     isosorbide mononitrate (IMDUR) 30 MG 24 hr tablet     lisinopril (PRINIVIL,ZESTRIL) 40 MG tablet     metoprolol tartrate (LOPRESSOR) 100 MG tablet     Multiple Vitamin (MULTIVITAMIN OR)     pantoprazole (PROTONIX) 40 MG EC tablet     triamterene-HCTZ (DYAZIDE) 37.5-25 MG capsule     cholecalciferol 1000 UNIT TABS     MECLIZINE HCL PO     nitroglycerin (NITROSTAT) 0.3 MG SL tablet      omeprazole (PRILOSEC) 40 MG DR capsule        Review of Systems   Constitutional: Negative.    HENT: Positive for hearing loss and tinnitus. Negative for congestion, ear discharge, ear pain, nosebleeds and sinus pain.    Eyes: Negative for blurred vision, double vision and photophobia.   Respiratory: Negative for cough, hemoptysis, sputum production and stridor.    Gastrointestinal: Positive for heartburn, nausea and vomiting.   Skin: Negative.    Neurological: Positive for dizziness. Negative for tingling, tremors and headaches.   Endo/Heme/Allergies: Negative for environmental allergies. Does not bruise/bleed easily.       Physical Exam  Vitals signs reviewed.   Constitutional:       Appearance: Normal appearance. He is normal weight.   HENT:      Head: Normocephalic and atraumatic.      Jaw: There is normal jaw occlusion.      Right Ear: Ear canal and external ear normal. Decreased hearing noted. No tenderness. No middle ear effusion. There is no impacted cerumen. Tympanic membrane is scarred.      Left Ear: Tympanic membrane, ear canal and external ear normal. Decreased hearing noted.  No middle ear effusion. There is no impacted cerumen.      No Spontaneous nystagmus.    Ear wax removal: He was seen in the room. Left ear canal was impacted with ear wax that was removed with a curette and suctioning. Right Ear drum is intact with a dimeric membrane. He tolerated the procedure well.    A/P  This 73 year old patient with Meniere's disease with a hx of 4 Endolymphatic sac surgery in his right ear. F/u in 6 months or earlier as needed.

## 2024-05-02 ENCOUNTER — OFFICE VISIT (OUTPATIENT)
Dept: OTOLARYNGOLOGY | Facility: OTHER | Age: 74
End: 2024-05-02
Payer: MEDICARE

## 2024-05-02 VITALS
HEIGHT: 70 IN | BODY MASS INDEX: 31.87 KG/M2 | SYSTOLIC BLOOD PRESSURE: 132 MMHG | DIASTOLIC BLOOD PRESSURE: 68 MMHG | WEIGHT: 222.6 LBS | TEMPERATURE: 97.9 F

## 2024-05-02 DIAGNOSIS — H90.3 SNHL (SENSORY-NEURAL HEARING LOSS), ASYMMETRICAL: ICD-10-CM

## 2024-05-02 DIAGNOSIS — H81.01 MENIERE'S DISEASE, RIGHT: Primary | ICD-10-CM

## 2024-05-02 PROCEDURE — 99213 OFFICE O/P EST LOW 20 MIN: CPT | Performed by: OTOLARYNGOLOGY

## 2024-05-02 RX ORDER — DIAZEPAM 5 MG
5 TABLET ORAL EVERY 6 HOURS PRN
Qty: 30 TABLET | Refills: 0 | Status: SHIPPED | OUTPATIENT
Start: 2024-05-02

## 2024-05-02 ASSESSMENT — ENCOUNTER SYMPTOMS
NAUSEA: 1
VOMITING: 1
TREMORS: 1
EYES NEGATIVE: 1
HEADACHES: 0
DIZZINESS: 1
RESPIRATORY NEGATIVE: 1
BLURRED VISION: 0
DOUBLE VISION: 0
CONSTITUTIONAL NEGATIVE: 1

## 2024-05-02 NOTE — LETTER
"    5/2/2024         RE: Arvin Britt  [address] [place] [address]        Dear Colleague,    Thank you for referring your patient, Arvin Britt, to the [facility]. Please see a copy of my visit note below.    Chief Complaint   Patient presents with     Follow Up     Vertigo, imaging results      PCP: Teresa Champion     Referring Provider: No ref. provider found    /68   Temp 97.9  F (36.6  C) (Temporal)   Ht 1.778 m (5' 10\")   Wt 101 kg (222 lb 9.6 oz)   BMI 31.94 kg/m      ENT Problem List:  There is no problem list on file for this patient.     Current Medications:  Current Outpatient Medications   Medication Sig Dispense Refill     amLODIPine (NORVASC) 5 MG tablet Take 10 mg by mouth daily       aspirin (ASA) 81 MG EC tablet Take 81 mg by mouth daily       atorvastatin (LIPITOR) 20 MG tablet Take 20 mg by mouth At Bedtime       fish oil-omega-3 fatty acids 1000 MG capsule Take 2 g by mouth daily       hydrALAZINE (APRESOLINE) 25 MG tablet Take 25 mg by mouth 3 times daily       isosorbide mononitrate (IMDUR) 30 MG 24 hr tablet Take 30 mg by mouth daily        lisinopril (PRINIVIL,ZESTRIL) 40 MG tablet Take 40 mg by mouth daily       MECLIZINE HCL PO Take 25 mg by mouth daily       metoprolol tartrate (LOPRESSOR) 100 MG tablet Take 100 mg by mouth daily        Multiple Vitamin (MULTIVITAMIN OR) Take by mouth daily       nitroglycerin (NITROSTAT) 0.3 MG SL tablet Place 0.3 mg under the tongue every 5 minutes as needed       pantoprazole (PROTONIX) 40 MG EC tablet 2 times daily        triamterene-HCTZ (DYAZIDE) 37.5-25 MG capsule Take 1 capsule by mouth every morning 30 capsule 3     triamterene-HCTZ (DYAZIDE) 37.5-25 MG capsule Take by mouth 2 times daily       cholecalciferol 1000 UNIT TABS Take  by mouth daily.         ipratropium (ATROVENT) 0.03 % nasal spray Spray 2 sprays into both nostrils 3 times daily 30 mL 6     loratadine (CLARITIN) 10 MG tablet Take " 10 mg by mouth daily       omeprazole (PRILOSEC) 40 MG DR capsule Take 40 mg by mouth daily       No current facility-administered medications for this visit.     CT ANGIO HEAD NECK CAROTID STROKE PROTOCOL MAGDA CANDIDAT, CT HEAD STROKE PROTOCOL WITHOUT CONTRAST 4/26/2024    HEAD CT:  1.  No CT evidence for acute intracranial process.  2.  Brain atrophy and presumed chronic microvascular ischemic changes as above.    HEAD CTA:  1.  No large vessel occlusion.  2.  There is a questionable flow-limiting stenosis involving the distal right vertebral artery just beyond the origin of the right posteroinferior cerebellar artery. There is streak artifact through this area which calls into question the validity of the finding.    NECK CTA:  1.  No measurable stenosis or evidence of dissection.    INDICATION: Possible intra-arterial stroke therapy candidate. Neurologic Dysfunction. Dizziness.  COMPARISON: 05/28/2022.  CONTRAST: Omnipaque-350, 40 mL (accession [id number]), *None  (accession [id number]).  TECHNIQUE: Head and neck CT angiogram with IV contrast. Noncontrast head CT followed by axial helical CT images of the head and neck vessels obtained during the arterial phase of intravenous contrast administration. Axial 2D reconstructed images and multiplanar 3D MIP reconstructed images of the head and neck vessels were performed by the technologist. Dose reduction techniques were used. All stenosis measurements made according to NASCET criteria unless otherwise specified.    FINDINGS:  NONCONTRAST HEAD CT:  INTRACRANIAL CONTENTS: No intracranial hemorrhage, extraaxial collection, or mass effect.  No CT evidence of acute infarct. Mild presumed chronic small vessel ischemic changes. Mild generalized volume loss. No hydrocephalus.    VISUALIZED ORBITS/SINUSES/MASTOIDS: No intraorbital abnormality. No paranasal sinus mucosal disease. No middle ear or mastoid effusion. Right canal wall-up mastoidectomy.    BONES/SOFT TISSUES: No  acute abnormality.    HEAD CTA:  ANTERIOR CIRCULATION: No stenosis/occlusion, aneurysm, or high flow vascular malformation. Mild atheromatous change in the carotid siphons. Fetal origin of the right posterior cerebral artery from the anterior circulation.    POSTERIOR CIRCULATION: No large vessel occlusion. There is questionable stenosis in the proximal right V4 segment. There is streak artifact through this area which may compromise evaluation, however the intensity of the contrast in the V4 segment beyond this point appears somewhat diminished compared with the remainder of the vessel. There is contrast-enhanced flow demonstrated within the right posteroinferior cerebellar artery. Dominant left and smaller right vertebral artery contribute to a normal basilar artery.    DURAL VENOUS SINUSES: Expected enhancement of the major dural venous sinuses.    NECK CTA:  RIGHT CAROTID: Mild atheromatous change. No measurable stenosis or dissection.    LEFT CAROTID: Mild atheromatous change. No measurable stenosis or dissection.    VERTEBRAL ARTERIES: No focal stenosis or dissection. Dominant left and smaller right vertebral arteries.    AORTIC ARCH: Classic aortic arch anatomy with no significant stenosis at the origin of the great vessels.    NONVASCULAR STRUCTURES: Unremarkable.    MR HEAD RAPID CODE STROKE LTD BRAIN WO CONTRAST 4/26/2024    1.  No acute intracranial process.  2.  Mild chronic microvascular ischemic changes as detailed above.  Narrative    INDICATION: Eval/ FU cerebral vascular disease or ischemia  COMPARISON: None.  TECHNIQUE: Head MRI without IV contrast including diffusion weighted imaging, FLAIR and hemosiderin sensitive sequences.    FINDINGS:  INTRACRANIAL CONTENTS: No acute or subacute infarct. No mass, acute hemorrhage, or extra-axial fluid collections. Scattered nonspecific T2/FLAIR hyperintensities within the cerebral white matter most consistent with mild chronic microvascular ischemic change.  "Mild generalized cerebral atrophy. No hydrocephalus. Normal position of the cerebellar tonsils.    OTHER: Accounting for technique no additional abnormalities identified.    HPI  Pleasant 74 year old male with his wife presents today as a(n) established patient for a follow-up from the ER on Friday 4/26 with vertigo and to review imaging results. He was last seen on 12/6/23. He reports that he was laying in bad and watching Youtube when he immediately had vertigo, shaking, sweating, nausea, and vomiting. He felt spinning and light-headedness. He states that his severe shaking scared him and what ultimately lead them to call an ambulance. He was given Valium and Meclizine which helped. In general, he states that this vertigo episode was a few hours, but there have been times that these episodes last up to 6-8 hours. He states that he is currently taking Meclizine, and his last episode before Friday was 1.5 years ago. He states that these episodes seem to occur most often in the evening, and that they cause him to close his eyes because his eyes are \"dancing\".   He has a hx of right Meniere's disease and a hx of 4 Endolymphatic sac surgery in his right ear.   He is on Hydrochlorothiazide, Lisinopril, and Lipitor. His wife states that he is on quite a few blood pressure medications. He has been on blood pressure medications since 2006 when he had stents put in his heart. His blood pressure was slightly high when he was in the ER, but it went down with Valium and Meclizine. He states that he has a hx of going to the ER for hypertension.  He has had some blood work recently that showed he is low in sodium.  He wears hearing aids, but states that he has continued hearing loss and would like to consider a different type of hearing aids.  Last fall, his wife and him went to [place] and caught COVID and is recently being treated for a skin condition, but otherwise he has not had any other known recent illnesses.  He is " getting cataract surgery in June.    He denies blurry vision, double vision, headache, fever, positional dizziness.    Review of Systems   Constitutional: Negative.    HENT:  Positive for hearing loss.    Eyes: Negative.  Negative for blurred vision and double vision.   Respiratory: Negative.     Gastrointestinal:  Positive for nausea and vomiting.   Skin: Negative.    Neurological:  Positive for dizziness and tremors. Negative for headaches.   Endo/Heme/Allergies: Negative.        Physical Exam  Vitals and nursing note reviewed.   Constitutional:       Appearance: Normal appearance.   HENT:      Head: Normocephalic and atraumatic.      Jaw: There is normal jaw occlusion.      Right Ear: Ear canal normal. Decreased hearing noted. Tympanic membrane is scarred.      Left Ear: Tympanic membrane and ear canal normal. Decreased hearing noted.      Nose: No mucosal edema, congestion or rhinorrhea.      Right Nostril: No occlusion.      Left Nostril: No occlusion.      Right Turbinates: Not enlarged or swollen.      Left Turbinates: Not enlarged or swollen.      Right Sinus: No maxillary sinus tenderness or frontal sinus tenderness.      Left Sinus: No maxillary sinus tenderness or frontal sinus tenderness.      Mouth/Throat:      Mouth: Mucous membranes are moist.      Pharynx: Oropharynx is clear. Uvula midline.   Eyes:      Extraocular Movements: Extraocular movements intact.      Pupils: Pupils are equal, round, and reactive to light.   Neurological:      Mental Status: He is alert.       AUDIOGRAM: The patient underwent an audiogram 12/6/23.  Results: Moderately-severe to profound SNHL right. WNL to severe SNHL left. 84% word rec. right, 92% left. Tymps WNL.  Right: Speech reception threshold is 65 dB with 84% word recognition.  Left: Speech reception threshold is 25 dB with 92% word recognition.    A/P  Audiogram and imaging was independently reviewed and discussed in detail with the patient. This pleasant patient  had an episode of Meniere's Disease and a possible electrolyte imbalance which lead to an ER visit on Friday. There are no sinus or ear infections present. Options including a myringotomy with PE tube insertion, intratympanic dexamethasone injections, and a high dose oral steroid taper were thoroughly discussed. Pros, cons, recovery, complications, recovery, questions, and concerns were addressed. Due to negative side effects of a high dose oral steroid taper, a right ear myringotomy with PE tube insertion and intratympanic dexamethasone injections are recommended. He decides to go through with surgery, and an order is put in for it. He will schedule the surgery for after his cataract surgery. He is refilled diazepam (VALIUM) 5 MG tablet, take 1 tablet (5 mg) by mouth every 6 hours as needed for anxiety, vomiting, nausea or seizures, today. He is advised to restrict salt, MSG's, fast food, and caffeine, and get good hydration.   An adult audiology  referral ise sent for a hearing aid evaluation.    Scribe/Staff:    Scribe Disclosure:   I, Bindu Guerrero, am serving as a scribe; to document services personally performed by Brandi Rouse MD based on data collection and the provider's statements to me.     Provider Disclosure:  I agree with above History, Review of Systems, Physical exam and Plan.  I have reviewed the content of the documentation and have edited it as needed. I have personally performed the services documented here and the documentation accurately represents those services and the decisions I have made.      Electronically signed by:  Brandi Rouse MD         Again, thank you for allowing me to participate in the care of your patient.        Sincerely,        Brandi Rouse MD   Discharge planning issues

## 2024-05-02 NOTE — PROGRESS NOTES
"Chief Complaint   Patient presents with    Follow Up     Vertigo, imaging results      PCP: Teresa Champion     Referring Provider: No ref. provider found    /68   Temp 97.9  F (36.6  C) (Temporal)   Ht 1.778 m (5' 10\")   Wt 101 kg (222 lb 9.6 oz)   BMI 31.94 kg/m      ENT Problem List:  There is no problem list on file for this patient.     Current Medications:  Current Outpatient Medications   Medication Sig Dispense Refill    amLODIPine (NORVASC) 5 MG tablet Take 10 mg by mouth daily      aspirin (ASA) 81 MG EC tablet Take 81 mg by mouth daily      atorvastatin (LIPITOR) 20 MG tablet Take 20 mg by mouth At Bedtime      fish oil-omega-3 fatty acids 1000 MG capsule Take 2 g by mouth daily      hydrALAZINE (APRESOLINE) 25 MG tablet Take 25 mg by mouth 3 times daily      isosorbide mononitrate (IMDUR) 30 MG 24 hr tablet Take 30 mg by mouth daily       lisinopril (PRINIVIL,ZESTRIL) 40 MG tablet Take 40 mg by mouth daily      MECLIZINE HCL PO Take 25 mg by mouth daily      metoprolol tartrate (LOPRESSOR) 100 MG tablet Take 100 mg by mouth daily       Multiple Vitamin (MULTIVITAMIN OR) Take by mouth daily      nitroglycerin (NITROSTAT) 0.3 MG SL tablet Place 0.3 mg under the tongue every 5 minutes as needed      pantoprazole (PROTONIX) 40 MG EC tablet 2 times daily       triamterene-HCTZ (DYAZIDE) 37.5-25 MG capsule Take 1 capsule by mouth every morning 30 capsule 3    triamterene-HCTZ (DYAZIDE) 37.5-25 MG capsule Take by mouth 2 times daily      cholecalciferol 1000 UNIT TABS Take  by mouth daily.        ipratropium (ATROVENT) 0.03 % nasal spray Spray 2 sprays into both nostrils 3 times daily 30 mL 6    loratadine (CLARITIN) 10 MG tablet Take 10 mg by mouth daily      omeprazole (PRILOSEC) 40 MG DR capsule Take 40 mg by mouth daily       No current facility-administered medications for this visit.     CT ANGIO HEAD NECK CAROTID STROKE PROTOCOL MAGDA CANDIDAT, CT HEAD STROKE PROTOCOL WITHOUT CONTRAST " 4/26/2024    HEAD CT:  1.  No CT evidence for acute intracranial process.  2.  Brain atrophy and presumed chronic microvascular ischemic changes as above.    HEAD CTA:  1.  No large vessel occlusion.  2.  There is a questionable flow-limiting stenosis involving the distal right vertebral artery just beyond the origin of the right posteroinferior cerebellar artery. There is streak artifact through this area which calls into question the validity of the finding.    NECK CTA:  1.  No measurable stenosis or evidence of dissection.    INDICATION: Possible intra-arterial stroke therapy candidate. Neurologic Dysfunction. Dizziness.  COMPARISON: 05/28/2022.  CONTRAST: Omnipaque-350, 40 mL (accession [id number]), *None  (accession [id number]).  TECHNIQUE: Head and neck CT angiogram with IV contrast. Noncontrast head CT followed by axial helical CT images of the head and neck vessels obtained during the arterial phase of intravenous contrast administration. Axial 2D reconstructed images and multiplanar 3D MIP reconstructed images of the head and neck vessels were performed by the technologist. Dose reduction techniques were used. All stenosis measurements made according to NASCET criteria unless otherwise specified.    FINDINGS:  NONCONTRAST HEAD CT:  INTRACRANIAL CONTENTS: No intracranial hemorrhage, extraaxial collection, or mass effect.  No CT evidence of acute infarct. Mild presumed chronic small vessel ischemic changes. Mild generalized volume loss. No hydrocephalus.    VISUALIZED ORBITS/SINUSES/MASTOIDS: No intraorbital abnormality. No paranasal sinus mucosal disease. No middle ear or mastoid effusion. Right canal wall-up mastoidectomy.    BONES/SOFT TISSUES: No acute abnormality.    HEAD CTA:  ANTERIOR CIRCULATION: No stenosis/occlusion, aneurysm, or high flow vascular malformation. Mild atheromatous change in the carotid siphons. Fetal origin of the right posterior cerebral artery from the anterior  circulation.    POSTERIOR CIRCULATION: No large vessel occlusion. There is questionable stenosis in the proximal right V4 segment. There is streak artifact through this area which may compromise evaluation, however the intensity of the contrast in the V4 segment beyond this point appears somewhat diminished compared with the remainder of the vessel. There is contrast-enhanced flow demonstrated within the right posteroinferior cerebellar artery. Dominant left and smaller right vertebral artery contribute to a normal basilar artery.    DURAL VENOUS SINUSES: Expected enhancement of the major dural venous sinuses.    NECK CTA:  RIGHT CAROTID: Mild atheromatous change. No measurable stenosis or dissection.    LEFT CAROTID: Mild atheromatous change. No measurable stenosis or dissection.    VERTEBRAL ARTERIES: No focal stenosis or dissection. Dominant left and smaller right vertebral arteries.    AORTIC ARCH: Classic aortic arch anatomy with no significant stenosis at the origin of the great vessels.    NONVASCULAR STRUCTURES: Unremarkable.    MR HEAD RAPID CODE STROKE LTD BRAIN WO CONTRAST 4/26/2024    1.  No acute intracranial process.  2.  Mild chronic microvascular ischemic changes as detailed above.  Narrative    INDICATION: Eval/ FU cerebral vascular disease or ischemia  COMPARISON: None.  TECHNIQUE: Head MRI without IV contrast including diffusion weighted imaging, FLAIR and hemosiderin sensitive sequences.    FINDINGS:  INTRACRANIAL CONTENTS: No acute or subacute infarct. No mass, acute hemorrhage, or extra-axial fluid collections. Scattered nonspecific T2/FLAIR hyperintensities within the cerebral white matter most consistent with mild chronic microvascular ischemic change. Mild generalized cerebral atrophy. No hydrocephalus. Normal position of the cerebellar tonsils.    OTHER: Accounting for technique no additional abnormalities identified.    HPI  Pleasant 74 year old male with his wife presents today as a(n)  "established patient for a follow-up from the ER on Friday 4/26 with vertigo and to review imaging results. He was last seen on 12/6/23. He reports that he was laying in bad and watching Youtube when he immediately had vertigo, shaking, sweating, nausea, and vomiting. He felt spinning and light-headedness. He states that his severe shaking scared him and what ultimately lead them to call an ambulance. He was given Valium and Meclizine which helped. In general, he states that this vertigo episode was a few hours, but there have been times that these episodes last up to 6-8 hours. He states that he is currently taking Meclizine, and his last episode before Friday was 1.5 years ago. He states that these episodes seem to occur most often in the evening, and that they cause him to close his eyes because his eyes are \"dancing\".   He has a hx of right Meniere's disease and a hx of 4 Endolymphatic sac surgery in his right ear.   He is on Hydrochlorothiazide, Lisinopril, and Lipitor. His wife states that he is on quite a few blood pressure medications. He has been on blood pressure medications since 2006 when he had stents put in his heart. His blood pressure was slightly high when he was in the ER, but it went down with Valium and Meclizine. He states that he has a hx of going to the ER for hypertension.  He has had some blood work recently that showed he is low in sodium.  He wears hearing aids, but states that he has continued hearing loss and would like to consider a different type of hearing aids.  Last fall, his wife and him went to [place] and caught COVID and is recently being treated for a skin condition, but otherwise he has not had any other known recent illnesses.  He is getting cataract surgery in June.    He denies blurry vision, double vision, headache, fever, positional dizziness.    Review of Systems   Constitutional: Negative.    HENT:  Positive for hearing loss.    Eyes: Negative.  Negative for blurred " vision and double vision.   Respiratory: Negative.     Gastrointestinal:  Positive for nausea and vomiting.   Skin: Negative.    Neurological:  Positive for dizziness and tremors. Negative for headaches.   Endo/Heme/Allergies: Negative.        Physical Exam  Vitals and nursing note reviewed.   Constitutional:       Appearance: Normal appearance.   HENT:      Head: Normocephalic and atraumatic.      Jaw: There is normal jaw occlusion.      Right Ear: Ear canal normal. Decreased hearing noted. Tympanic membrane is scarred.      Left Ear: Tympanic membrane and ear canal normal. Decreased hearing noted.      Nose: No mucosal edema, congestion or rhinorrhea.      Right Nostril: No occlusion.      Left Nostril: No occlusion.      Right Turbinates: Not enlarged or swollen.      Left Turbinates: Not enlarged or swollen.      Right Sinus: No maxillary sinus tenderness or frontal sinus tenderness.      Left Sinus: No maxillary sinus tenderness or frontal sinus tenderness.      Mouth/Throat:      Mouth: Mucous membranes are moist.      Pharynx: Oropharynx is clear. Uvula midline.   Eyes:      Extraocular Movements: Extraocular movements intact.      Pupils: Pupils are equal, round, and reactive to light.   Neurological:      Mental Status: He is alert.       AUDIOGRAM: The patient underwent an audiogram 12/6/23.  Results: Moderately-severe to profound SNHL right. WNL to severe SNHL left. 84% word rec. right, 92% left. Tymps WNL.  Right: Speech reception threshold is 65 dB with 84% word recognition.  Left: Speech reception threshold is 25 dB with 92% word recognition.    A/P  Audiogram and imaging was independently reviewed and discussed in detail with the patient. This pleasant patient had an episode of Meniere's Disease and a possible electrolyte imbalance which lead to an ER visit on Friday. There are no sinus or ear infections present. Options including a myringotomy with PE tube insertion, intratympanic dexamethasone  injections, and a high dose oral steroid taper were thoroughly discussed. Pros, cons, recovery, complications, recovery, questions, and concerns were addressed. Due to negative side effects of a high dose oral steroid taper, a right ear myringotomy with PE tube insertion and intratympanic dexamethasone injections are recommended. He decides to go through with surgery, and an order is put in for it. He will schedule the surgery for after his cataract surgery. He is refilled diazepam (VALIUM) 5 MG tablet, take 1 tablet (5 mg) by mouth every 6 hours as needed for anxiety, vomiting, nausea or seizures, today. He is advised to restrict salt, MSG's, fast food, and caffeine, and get good hydration.   An adult audiology  referral ise sent for a hearing aid evaluation.    Scribe/Staff:    Scribe Disclosure:   I, Bindu Guerrero, am serving as a scribe; to document services personally performed by Brandi Rouse MD based on data collection and the provider's statements to me.     Provider Disclosure:  I agree with above History, Review of Systems, Physical exam and Plan.  I have reviewed the content of the documentation and have edited it as needed. I have personally performed the services documented here and the documentation accurately represents those services and the decisions I have made.      Electronically signed by:  Brandi Rouse MD

## 2024-05-03 PROBLEM — H81.01 MENIERE'S DISEASE, RIGHT: Status: ACTIVE | Noted: 2024-05-02

## 2024-05-07 ENCOUNTER — TELEPHONE (OUTPATIENT)
Dept: OTOLARYNGOLOGY | Facility: CLINIC | Age: 74
End: 2024-05-07
Payer: MEDICARE

## 2024-05-07 NOTE — TELEPHONE ENCOUNTER
Patient is scheduled for surgery with Dr. Rouse.     Spoke with: Patient     Date of Surgery: 7/02/2024    Location:  [facility]     Pre op with Provider: ASHLEIGH     H&P: Patient will schedule pre op with Teresa Champion. Informed patient pre op will need to be completed within 30 days of surgery date.     Additional imaging/appointments: Patient is scheduled for a 2-3 week post op with Dr. Rouse on 7/16/24 at 10:40 AM.     Surgery packet: Per patient preference, will send surgery packet through [facility]. Patient made aware arrival time for surgery will not be listed within packet.      Additional comments: Informed patient a pre op nurse will call 2-5 days prior to surgery to go over further details/give arrival time.     Patient asked for a call back to confirm what medications he should be withholding prior to surgery and when.       Althea Tucker on 5/7/2024 at 12:35 PM

## 2024-05-22 ENCOUNTER — TRANSCRIBE ORDERS (OUTPATIENT)
Dept: OTHER | Age: 74
End: 2024-05-22

## 2024-05-22 DIAGNOSIS — L95.8 URTICARIAL VASCULITIS: Primary | ICD-10-CM

## 2024-05-28 ENCOUNTER — OFFICE VISIT (OUTPATIENT)
Dept: AUDIOLOGY | Facility: CLINIC | Age: 74
End: 2024-05-28
Attending: OTOLARYNGOLOGY
Payer: MEDICARE

## 2024-05-28 DIAGNOSIS — H90.3 SNHL (SENSORY-NEURAL HEARING LOSS), ASYMMETRICAL: Primary | ICD-10-CM

## 2024-05-28 DIAGNOSIS — H81.01 MENIERE'S DISEASE, RIGHT: ICD-10-CM

## 2024-05-28 PROCEDURE — 92567 TYMPANOMETRY: CPT | Performed by: AUDIOLOGIST

## 2024-05-28 PROCEDURE — 92557 COMPREHENSIVE HEARING TEST: CPT | Performed by: AUDIOLOGIST

## 2024-05-28 NOTE — PROGRESS NOTES
AUDIOLOGY REPORT    SUMMARY: Audiology visit completed. See audiogram for results.    RECOMMENDATIONS: Follow-up with ENT.    Juan Carlos Bryant  Doctor of Audiology  [place] License # [id number]

## 2024-06-15 ENCOUNTER — HEALTH MAINTENANCE LETTER (OUTPATIENT)
Age: 74
End: 2024-06-15

## 2024-06-29 ENCOUNTER — ANESTHESIA EVENT (OUTPATIENT)
Dept: SURGERY | Facility: AMBULATORY SURGERY CENTER | Age: 74
End: 2024-06-29
Payer: MEDICARE

## 2024-07-02 ENCOUNTER — HOSPITAL ENCOUNTER (OUTPATIENT)
Facility: AMBULATORY SURGERY CENTER | Age: 74
Discharge: HOME | End: 2024-07-02
Attending: OTOLARYNGOLOGY | Admitting: OTOLARYNGOLOGY
Payer: MEDICARE

## 2024-07-02 ENCOUNTER — ANESTHESIA (OUTPATIENT)
Dept: SURGERY | Facility: AMBULATORY SURGERY CENTER | Age: 74
End: 2024-07-02
Payer: MEDICARE

## 2024-07-02 VITALS
DIASTOLIC BLOOD PRESSURE: 98 MMHG | HEIGHT: 70 IN | OXYGEN SATURATION: 97 % | HEART RATE: 66 BPM | SYSTOLIC BLOOD PRESSURE: 150 MMHG | BODY MASS INDEX: 31.34 KG/M2 | TEMPERATURE: 97.8 F | RESPIRATION RATE: 16 BRPM | WEIGHT: 218.92 LBS

## 2024-07-02 DIAGNOSIS — H81.01 MENIERE'S DISEASE, RIGHT: Primary | ICD-10-CM

## 2024-07-02 DIAGNOSIS — Z96.22 S/P MYRINGOTOMY WITH INSERTION OF TUBE: ICD-10-CM

## 2024-07-02 PROCEDURE — 69801 INCISE INNER EAR: CPT | Performed by: ANESTHESIOLOGY

## 2024-07-02 PROCEDURE — 69436 CREATE EARDRUM OPENING: CPT | Mod: RT

## 2024-07-02 PROCEDURE — 99100 ANES PT EXTEME AGE<1 YR&>70: CPT | Performed by: REGISTERED NURSE

## 2024-07-02 PROCEDURE — 69801 INCISE INNER EAR: CPT | Performed by: REGISTERED NURSE

## 2024-07-02 PROCEDURE — G8918 PT W/O PREOP ORDER IV AB PRO: HCPCS

## 2024-07-02 PROCEDURE — 99100 ANES PT EXTEME AGE<1 YR&>70: CPT | Performed by: ANESTHESIOLOGY

## 2024-07-02 PROCEDURE — G8907 PT DOC NO EVENTS ON DISCHARG: HCPCS

## 2024-07-02 PROCEDURE — 69801 INCISE INNER EAR: CPT | Mod: RT | Performed by: OTOLARYNGOLOGY

## 2024-07-02 RX ORDER — PROPOFOL 10 MG/ML
INJECTION, EMULSION INTRAVENOUS PRN
Status: DISCONTINUED | OUTPATIENT
Start: 2024-07-02 | End: 2024-07-02

## 2024-07-02 RX ORDER — CIPROFLOXACIN AND DEXAMETHASONE 3; 1 MG/ML; MG/ML
SUSPENSION/ DROPS AURICULAR (OTIC) PRN
Status: DISCONTINUED | OUTPATIENT
Start: 2024-07-02 | End: 2024-07-02 | Stop reason: HOSPADM

## 2024-07-02 RX ORDER — DIPHENHYDRAMINE HCL 12.5MG/5ML
12.5 LIQUID (ML) ORAL EVERY 6 HOURS PRN
Status: DISCONTINUED | OUTPATIENT
Start: 2024-07-02 | End: 2024-07-03 | Stop reason: HOSPADM

## 2024-07-02 RX ORDER — ALBUTEROL SULFATE 0.83 MG/ML
2.5 SOLUTION RESPIRATORY (INHALATION) EVERY 4 HOURS PRN
Status: DISCONTINUED | OUTPATIENT
Start: 2024-07-02 | End: 2024-07-03 | Stop reason: HOSPADM

## 2024-07-02 RX ORDER — OXYCODONE HYDROCHLORIDE 5 MG/1
10 TABLET ORAL
Status: DISCONTINUED | OUTPATIENT
Start: 2024-07-02 | End: 2024-07-03 | Stop reason: HOSPADM

## 2024-07-02 RX ORDER — ONDANSETRON 2 MG/ML
4 INJECTION INTRAMUSCULAR; INTRAVENOUS EVERY 30 MIN PRN
Status: DISCONTINUED | OUTPATIENT
Start: 2024-07-02 | End: 2024-07-03 | Stop reason: HOSPADM

## 2024-07-02 RX ORDER — LORAZEPAM 2 MG/ML
.5-1 INJECTION INTRAMUSCULAR
Status: DISCONTINUED | OUTPATIENT
Start: 2024-07-02 | End: 2024-07-03 | Stop reason: HOSPADM

## 2024-07-02 RX ORDER — NALOXONE HYDROCHLORIDE 0.4 MG/ML
0.1 INJECTION, SOLUTION INTRAMUSCULAR; INTRAVENOUS; SUBCUTANEOUS
Status: DISCONTINUED | OUTPATIENT
Start: 2024-07-02 | End: 2024-07-03 | Stop reason: HOSPADM

## 2024-07-02 RX ORDER — LIDOCAINE 40 MG/G
CREAM TOPICAL
Status: DISCONTINUED | OUTPATIENT
Start: 2024-07-02 | End: 2024-07-03 | Stop reason: HOSPADM

## 2024-07-02 RX ORDER — LABETALOL HYDROCHLORIDE 5 MG/ML
10 INJECTION, SOLUTION INTRAVENOUS
Status: DISCONTINUED | OUTPATIENT
Start: 2024-07-02 | End: 2024-07-03 | Stop reason: HOSPADM

## 2024-07-02 RX ORDER — DEXAMETHASONE SODIUM PHOSPHATE 4 MG/ML
4 INJECTION, SOLUTION INTRA-ARTICULAR; INTRALESIONAL; INTRAMUSCULAR; INTRAVENOUS; SOFT TISSUE
Status: DISCONTINUED | OUTPATIENT
Start: 2024-07-02 | End: 2024-07-03 | Stop reason: HOSPADM

## 2024-07-02 RX ORDER — OXYCODONE HYDROCHLORIDE 5 MG/1
5 TABLET ORAL
Status: DISCONTINUED | OUTPATIENT
Start: 2024-07-02 | End: 2024-07-03 | Stop reason: HOSPADM

## 2024-07-02 RX ORDER — FENTANYL CITRATE 50 UG/ML
25 INJECTION, SOLUTION INTRAMUSCULAR; INTRAVENOUS EVERY 5 MIN PRN
Status: DISCONTINUED | OUTPATIENT
Start: 2024-07-02 | End: 2024-07-03 | Stop reason: HOSPADM

## 2024-07-02 RX ORDER — HYDRALAZINE HYDROCHLORIDE 20 MG/ML
2.5-5 INJECTION INTRAMUSCULAR; INTRAVENOUS EVERY 10 MIN PRN
Status: DISCONTINUED | OUTPATIENT
Start: 2024-07-02 | End: 2024-07-03 | Stop reason: HOSPADM

## 2024-07-02 RX ORDER — FENTANYL CITRATE 50 UG/ML
25 INJECTION, SOLUTION INTRAMUSCULAR; INTRAVENOUS
Status: DISCONTINUED | OUTPATIENT
Start: 2024-07-02 | End: 2024-07-03 | Stop reason: HOSPADM

## 2024-07-02 RX ORDER — SODIUM CHLORIDE, SODIUM LACTATE, POTASSIUM CHLORIDE, CALCIUM CHLORIDE 600; 310; 30; 20 MG/100ML; MG/100ML; MG/100ML; MG/100ML
INJECTION, SOLUTION INTRAVENOUS CONTINUOUS
Status: DISCONTINUED | OUTPATIENT
Start: 2024-07-02 | End: 2024-07-03 | Stop reason: HOSPADM

## 2024-07-02 RX ORDER — DEXAMETHASONE SODIUM PHOSPHATE 4 MG/ML
INJECTION, SOLUTION INTRA-ARTICULAR; INTRALESIONAL; INTRAMUSCULAR; INTRAVENOUS; SOFT TISSUE PRN
Status: DISCONTINUED | OUTPATIENT
Start: 2024-07-02 | End: 2024-07-02

## 2024-07-02 RX ORDER — DIPHENHYDRAMINE HYDROCHLORIDE 50 MG/ML
12.5 INJECTION INTRAMUSCULAR; INTRAVENOUS EVERY 6 HOURS PRN
Status: DISCONTINUED | OUTPATIENT
Start: 2024-07-02 | End: 2024-07-03 | Stop reason: HOSPADM

## 2024-07-02 RX ORDER — ACETAMINOPHEN 325 MG/1
975 TABLET ORAL ONCE
Status: DISCONTINUED | OUTPATIENT
Start: 2024-07-02 | End: 2024-07-03 | Stop reason: HOSPADM

## 2024-07-02 RX ORDER — FENTANYL CITRATE 50 UG/ML
50 INJECTION, SOLUTION INTRAMUSCULAR; INTRAVENOUS EVERY 5 MIN PRN
Status: DISCONTINUED | OUTPATIENT
Start: 2024-07-02 | End: 2024-07-03 | Stop reason: HOSPADM

## 2024-07-02 RX ORDER — MEPERIDINE HYDROCHLORIDE 25 MG/ML
12.5 INJECTION INTRAMUSCULAR; INTRAVENOUS; SUBCUTANEOUS EVERY 5 MIN PRN
Status: DISCONTINUED | OUTPATIENT
Start: 2024-07-02 | End: 2024-07-03 | Stop reason: HOSPADM

## 2024-07-02 RX ORDER — ACETAMINOPHEN 325 MG/1
975 TABLET ORAL ONCE
Status: COMPLETED | OUTPATIENT
Start: 2024-07-02 | End: 2024-07-02

## 2024-07-02 RX ORDER — OFLOXACIN 3 MG/ML
5 SOLUTION AURICULAR (OTIC) 2 TIMES DAILY
Qty: 10 ML | Refills: 0 | Status: SHIPPED | OUTPATIENT
Start: 2024-07-02 | End: 2024-07-07

## 2024-07-02 RX ORDER — LIDOCAINE HYDROCHLORIDE 20 MG/ML
INJECTION, SOLUTION INFILTRATION; PERINEURAL PRN
Status: DISCONTINUED | OUTPATIENT
Start: 2024-07-02 | End: 2024-07-02

## 2024-07-02 RX ORDER — ONDANSETRON 4 MG/1
4 TABLET, ORALLY DISINTEGRATING ORAL EVERY 30 MIN PRN
Status: DISCONTINUED | OUTPATIENT
Start: 2024-07-02 | End: 2024-07-03 | Stop reason: HOSPADM

## 2024-07-02 RX ORDER — KETOROLAC TROMETHAMINE 30 MG/ML
15 INJECTION, SOLUTION INTRAMUSCULAR; INTRAVENOUS
Status: DISCONTINUED | OUTPATIENT
Start: 2024-07-02 | End: 2024-07-03 | Stop reason: HOSPADM

## 2024-07-02 RX ORDER — FENTANYL CITRATE 50 UG/ML
INJECTION, SOLUTION INTRAMUSCULAR; INTRAVENOUS PRN
Status: DISCONTINUED | OUTPATIENT
Start: 2024-07-02 | End: 2024-07-02

## 2024-07-02 RX ORDER — PROPOFOL 10 MG/ML
INJECTION, EMULSION INTRAVENOUS CONTINUOUS PRN
Status: DISCONTINUED | OUTPATIENT
Start: 2024-07-02 | End: 2024-07-02

## 2024-07-02 RX ORDER — ONDANSETRON 2 MG/ML
INJECTION INTRAMUSCULAR; INTRAVENOUS PRN
Status: DISCONTINUED | OUTPATIENT
Start: 2024-07-02 | End: 2024-07-02

## 2024-07-02 RX ADMIN — PROPOFOL 20 MG: 10 INJECTION, EMULSION INTRAVENOUS at 14:56

## 2024-07-02 RX ADMIN — ONDANSETRON 4 MG: 2 INJECTION INTRAMUSCULAR; INTRAVENOUS at 14:57

## 2024-07-02 RX ADMIN — PROPOFOL 75 MCG/KG/MIN: 10 INJECTION, EMULSION INTRAVENOUS at 14:38

## 2024-07-02 RX ADMIN — PROPOFOL 40 MG: 10 INJECTION, EMULSION INTRAVENOUS at 14:38

## 2024-07-02 RX ADMIN — SODIUM CHLORIDE, SODIUM LACTATE, POTASSIUM CHLORIDE, CALCIUM CHLORIDE: 600; 310; 30; 20 INJECTION, SOLUTION INTRAVENOUS at 13:31

## 2024-07-02 RX ADMIN — DEXAMETHASONE SODIUM PHOSPHATE 8 MG: 4 INJECTION, SOLUTION INTRA-ARTICULAR; INTRALESIONAL; INTRAMUSCULAR; INTRAVENOUS; SOFT TISSUE at 14:45

## 2024-07-02 RX ADMIN — ACETAMINOPHEN 975 MG: 325 TABLET ORAL at 13:27

## 2024-07-02 RX ADMIN — FENTANYL CITRATE 25 MCG: 50 INJECTION, SOLUTION INTRAMUSCULAR; INTRAVENOUS at 14:37

## 2024-07-02 RX ADMIN — FENTANYL CITRATE 25 MCG: 50 INJECTION, SOLUTION INTRAMUSCULAR; INTRAVENOUS at 14:41

## 2024-07-02 RX ADMIN — LIDOCAINE HYDROCHLORIDE 40 MG: 20 INJECTION, SOLUTION INFILTRATION; PERINEURAL at 14:38

## 2024-07-02 ASSESSMENT — LIFESTYLE VARIABLES: TOBACCO_USE: 1

## 2024-07-02 NOTE — DISCHARGE INSTRUCTIONS
You had 975 mg of Tylenol at 1:30 PM. You may repeat this after 7:30 PM. Maximum amount of Tylenol/Acetaminophen in a 24 hour period is 4,000 mg.      To Contact Dr Rouse or On- Call MD    Call Daytime Business hours:  [phone]    After hours:  On Call Resident (ENT)  [phone]    Dr. Rouse Cell Phone Number  [phone]      Instructions for Myringotomy Tubes (Ear Tubes)    Recovery - The placement of ear tubes is a brief operation, and therefore the recovery from the anesthetic is usually less than a day.  However, in young children the sleep patterns, feeding, and behavior can be altered for several days.  Try to return to the daily routine as soon as possible and this issue will resolve without problems.  There are no restrictions to diet or activity after ear tube placement.    Medications - Children and adults can return to all preoperative medications after this procedure, including blood thinners.  You were sent home with ear drops, please use them as directed to assist in the rapid healing of the ear drum around the tube.  Pain medication may have been sent home with you, but a vast majority of the time, over the counter Tylenol or ibuprofen (advil) I sufficient.    Complications - A low grade fever (up to 100 degrees ) is not unusual in the day after tubes are placed.  Treat this with cool wash cloths to the forehead and Tylenol.  If the fever is higher, or does not respond to medication, call the Doctor s office or call service after hours.  A small amount of bloody drainage can occur for a day or two after ear tubes, and is perfectly normal, continue the ear drops as directed and it will clear up.    Water Precautions - Recent clinical research has shown that absolute water precautions are not always necessary.  In the case of normal baths and showers, it is safe to not use ear plugs after a routine ear tube procedure.  However, please do prevent water from swimming pools, lakes, rivers,  streams, and ocean water from getting in ears with tubes in them, as a serious ear infection can result.    Follow up - Approximately 2 weeks after the tubes are placed I like to examine the ears to make sure there are no signs of complications, which are extremely rare.  In some unusual cases the ears  reject  the tubes.  Depending on the situation, a hearing test may or may not be performed at that time.  Afterwards, follow up is done every 6 months, but of course earlier if there are any issues or problems.    Advantages of Tubes - After ear tube placement, there are certain benefits from having a direct communication of the middle ear space with the ear canal.  In the event of drainage from the ears with ear tubes in place ( which is common with colds and flus ) use the ear drops you were discharged home with using the same dosage and instructions.  This will clear up the ears without the need for oral antibiotics a majority of the time.  Another advantage is that with tubes in place, the ears automatically adjust to changes in atmospheric pressure ( such as in airplanes or elevation ).  In other words, if the tubes are open the ears will not hurt or pop!

## 2024-07-02 NOTE — ANESTHESIA POSTPROCEDURE EVALUATION
Patient: Arvin Britt    Procedure: Procedure(s):  COMBINED MYRINGOTOMY, INSERT TUBE  INJECTION, STEROID, right ear       Anesthesia Type:  MAC    Note:  Disposition: Outpatient   Postop Pain Control: Uneventful            Sign Out: Well controlled pain   PONV: No   Neuro/Psych: Uneventful            Sign Out: Acceptable/Baseline neuro status   Airway/Respiratory: Uneventful            Sign Out: Acceptable/Baseline resp. status   CV/Hemodynamics: Uneventful            Sign Out: Acceptable CV status; No obvious hypovolemia; No obvious fluid overload   Other NRE: NONE   DID A NON-ROUTINE EVENT OCCUR? No       Last vitals:  Vitals Value Taken Time   /84 07/02/24 1515   Temp 97.8  F (36.6  C) 07/02/24 1513   Pulse 58 07/02/24 1515   Resp 16 07/02/24 1513   SpO2 100 % 07/02/24 1517   Vitals shown include unfiled device data.    Electronically Signed By: Eber Puente MD  July 2, 2024  3:18 PM

## 2024-07-02 NOTE — OP NOTE
Name: Arvin Britt     MR#: [id number]     : 1950   Procedure date: 2024  Surgeon:    Brandi Rouse MD.  Preoperative diagnosis:  1. Right Meniere's disease      2. Right Eustachian tube dysfunction  Postoperaive Diagnosis:  Same  Procedure performed:   Right myringotomy+ PE tube insertion + Right intratympanic Dexamethasone inoculation  Anesthesia:    Monitored Anesthesia Care  Estimated Blood Loss:  None  Complications:    None  Specimen:    None  Findings:    Scarred Tympanic membrane+ Posterior superior retraction  Disposition:   To the postanesthesia care unit in stable condition.    Procedure in detail:   The patient was identified in the preoperative area and after getting the informed consent, the patient was transferred to the operating room, placed on the operating table in supine position. He was then sedated by anesthesia under Monitored Anesthesia Care (MAC) without difficulty. Bed was then turned and he was prepped and draped appropriately. His right ear canal was visualized under the microscope. Ear wax was removed. Ear drum was visualized. It was scarred with dimeric membrane and retracted at the posterior superior quadrant.   Then a myringotomy was performed with a sickle knife at the postero-inferior quadrant. 1 ml of 24 mg. Dexamethasone applied to the middle ear. A PE tube that was 1.27 mm was inserted. 4 drops of Ofloxacin applied.   The patient was then turned back to anesthesia and extubated in the operating room without complications and transferred to postanesthesia care unit in stable condition.

## 2024-07-02 NOTE — ANESTHESIA POSTPROCEDURE EVALUATION
Patient: Arvin Britt    Procedure: Procedure(s):  COMBINED MYRINGOTOMY, INSERT TUBE  INJECTION, STEROID, right ear       Anesthesia Type:  MAC    Note:  Disposition: Outpatient   Postop Pain Control: Uneventful            Sign Out: Well controlled pain   PONV: No   Neuro/Psych: Uneventful            Sign Out: Acceptable/Baseline neuro status   Airway/Respiratory: Uneventful            Sign Out: Acceptable/Baseline resp. status   CV/Hemodynamics: Uneventful            Sign Out: Acceptable CV status; No obvious hypovolemia; No obvious fluid overload   Other NRE: NONE   DID A NON-ROUTINE EVENT OCCUR? No       Last vitals:  Vitals Value Taken Time   /84 07/02/24 1515   Temp 97.8  F (36.6  C) 07/02/24 1513   Pulse 58 07/02/24 1515   Resp 16 07/02/24 1513   SpO2 94 % 07/02/24 1529   Vitals shown include unfiled device data.    Electronically Signed By: Eber Puente MD  July 2, 2024  3:29 PM

## 2024-07-02 NOTE — ANESTHESIA CARE TRANSFER NOTE
Patient: Arvin Britt    Procedure: Procedure(s):  COMBINED MYRINGOTOMY, INSERT TUBE  INJECTION, STEROID, right ear       Diagnosis: Meniere's disease, right [H81.01]  Diagnosis Additional Information: No value filed.    Anesthesia Type:   MAC     Note:    Oropharynx: oropharynx clear of all foreign objects and spontaneously breathing  Level of Consciousness: awake and drowsy  Oxygen Supplementation: face mask  Level of Supplemental Oxygen (L/min / FiO2): 6  Independent Airway: airway patency satisfactory and stable  Dentition: dentition unchanged  Vital Signs Stable: post-procedure vital signs reviewed and stable  Report to RN Given: handoff report given  Patient transferred to: PACU    Handoff Report: Identifed the Patient, Identified the Reponsible Provider, Reviewed the pertinent medical history, Discussed the surgical course, Reviewed Intra-OP anesthesia mangement and issues during anesthesia, Set expectations for post-procedure period and Allowed opportunity for questions and acknowledgement of understanding      Vitals:  Vitals Value Taken Time   BP     Temp 97.8    Pulse     Resp     SpO2 97 % 07/02/24 1511   Vitals shown include unfiled device data.    Electronically Signed By: JEFF Cullen CRNA  July 2, 2024  3:11 PM

## 2024-07-15 ASSESSMENT — ENCOUNTER SYMPTOMS
GASTROINTESTINAL NEGATIVE: 1
EYES NEGATIVE: 1
RESPIRATORY NEGATIVE: 1
CONSTITUTIONAL NEGATIVE: 1

## 2024-07-15 NOTE — PROGRESS NOTES
Chief Complaint   Patient presents with    Surgical Followup     2 week S/P COMBINED MYRINGOTOMY, INSERT TUBE Right INJECTION, STEROID DOS 7-2-24  Tinnitus is now gone in right ear.       PCP: Teresa Champion     Referring Provider: No ref. provider found    There were no vitals taken for this visit.    ENT Problem List:  Patient Active Problem List   Diagnosis    Meniere's disease, right      Current Medications:  Current Outpatient Medications   Medication Sig Dispense Refill    amLODIPine (NORVASC) 5 MG tablet Take 10 mg by mouth daily      aspirin (ASA) 81 MG EC tablet Take 81 mg by mouth daily      atorvastatin (LIPITOR) 20 MG tablet Take 20 mg by mouth At Bedtime      diazepam (VALIUM) 5 MG tablet Take 1 tablet (5 mg) by mouth every 6 hours as needed for anxiety, vomiting, nausea or seizures 30 tablet 0    fish oil-omega-3 fatty acids 1000 MG capsule Take 2 g by mouth daily      folic acid (FOLVITE) 1 MG tablet Take 1 mg by mouth      hydrALAZINE (APRESOLINE) 25 MG tablet Take 25 mg by mouth 3 times daily      isosorbide mononitrate (IMDUR) 30 MG 24 hr tablet Take 30 mg by mouth daily       lisinopril (PRINIVIL,ZESTRIL) 40 MG tablet Take 40 mg by mouth daily      MECLIZINE HCL PO Take 25 mg by mouth daily      methotrexate 2.5 MG tablet Take 6 tablets by mouth on Sundays.  Have blood drawn 5 days after first dose.      metoprolol tartrate (LOPRESSOR) 100 MG tablet Take 100 mg by mouth daily       Multiple Vitamin (MULTIVITAMIN OR) Take by mouth daily      nitroglycerin (NITROSTAT) 0.3 MG SL tablet Place 0.3 mg under the tongue every 5 minutes as needed      pantoprazole (PROTONIX) 40 MG EC tablet 2 times daily       triamterene-HCTZ (DYAZIDE) 37.5-25 MG capsule Take 1 capsule by mouth every morning 30 capsule 3    cholecalciferol 1000 UNIT TABS Take  by mouth daily.        ipratropium (ATROVENT) 0.03 % nasal spray Spray 2 sprays into both nostrils 3 times daily 30 mL 6    omeprazole (PRILOSEC) 40 MG   capsule Take 40 mg by mouth daily      triamterene-HCTZ (DYAZIDE) 37.5-25 MG capsule Take by mouth 2 times daily       No current facility-administered medications for this visit.     HPI  Pleasant 74 year old male with his wife presents today as a(n) established patient for 2 week s/p combined myringotomy, insert tube (right ear), injection, steroid (right ear) DOS 7/2/24. He reports that he had a very slight dizziness when waking up one morning a couple of days after the surgery, but otherwise has not had problems. He describes the dizziness as positional. He states that he is on several blood pressure medications.  He reports that his tinnitus has gone down in his right ear.  He reports that he wears hearing aids and is happy with them. He reports a rubbing sound with his hearing aids at times, mostly in the left ear. Therefore, he uses double sided tape which helps keeps the hearing aids in place.  He reports a hx of cataract surgery and due to this surgery, he does not have to wear glasses.    Review of Systems   Constitutional: Negative.    HENT:  Positive for hearing loss and tinnitus.    Eyes: Negative.    Respiratory: Negative.     Gastrointestinal: Negative.    Skin: Negative.    Neurological:  Positive for dizziness.   Endo/Heme/Allergies: Negative.        Physical Exam  Vitals and nursing note reviewed.   Constitutional:       Appearance: Normal appearance.   HENT:      Head: Normocephalic and atraumatic.      Jaw: There is normal jaw occlusion.      Right Ear: Tympanic membrane and ear canal normal. Decreased hearing noted. A PE tube is present.      Left Ear: Tympanic membrane and ear canal normal. Decreased hearing noted.      Nose: No mucosal edema, congestion or rhinorrhea.      Right Nostril: No occlusion.      Left Nostril: No occlusion.      Right Turbinates: Not enlarged or swollen.      Left Turbinates: Not enlarged or swollen.      Right Sinus: No maxillary sinus tenderness or frontal sinus  tenderness.      Left Sinus: No maxillary sinus tenderness or frontal sinus tenderness.      Mouth/Throat:      Mouth: Mucous membranes are moist.      Pharynx: Oropharynx is clear. Uvula midline.   Eyes:      Extraocular Movements: Extraocular movements intact.      Pupils: Pupils are equal, round, and reactive to light.   Neurological:      Mental Status: He is alert.       A/P  This pleasant patient is 2 week s/p combined myringotomy, insert tube (right ear), injection, steroid (right ear) DOS 7/2/24 and has right Meniere's disease. There are no ear, sinus, or throat infections present. The right PE tube is open and functioning properly. The option of another intratympanic steroid injection in the right ear if he has more dizzy spells is discussed.     He is prescribed ciprofloxacin-dexAMETHasone (CIPRODEX) 0.3-0.1 % otic suspension, place 4 drops into the right ear 2 times daily, today to use in case he experiences otorrhea or otalgia in the right ear due to OM.     Questions and concerns were addressed.    Follow up in clinic in 4-5 months or earlier prn.    Scribe/Staff:    Scribe Disclosure:   I, Bindu Guerrero, am serving as a scribe; to document services personally performed by Brandi Rouse MD based on data collection and the provider's statements to me.     Provider Disclosure:  I agree with above History, Review of Systems, Physical exam and Plan.  I have reviewed the content of the documentation and have edited it as needed. I have personally performed the services documented here and the documentation accurately represents those services and the decisions I have made.      Electronically signed by:  Brandi Rouse MD

## 2024-07-16 ENCOUNTER — OFFICE VISIT (OUTPATIENT)
Dept: OTOLARYNGOLOGY | Facility: CLINIC | Age: 74
End: 2024-07-16
Payer: MEDICARE

## 2024-07-16 DIAGNOSIS — H81.03 MENIERE'S DISEASE, BILATERAL: Primary | ICD-10-CM

## 2024-07-16 DIAGNOSIS — H65.91 OME (OTITIS MEDIA WITH EFFUSION), RIGHT: ICD-10-CM

## 2024-07-16 DIAGNOSIS — H69.91 DYSFUNCTION OF RIGHT EUSTACHIAN TUBE: ICD-10-CM

## 2024-07-16 PROCEDURE — 99213 OFFICE O/P EST LOW 20 MIN: CPT | Performed by: OTOLARYNGOLOGY

## 2024-07-16 RX ORDER — CIPROFLOXACIN AND DEXAMETHASONE 3; 1 MG/ML; MG/ML
4 SUSPENSION/ DROPS AURICULAR (OTIC) 2 TIMES DAILY
Qty: 7.5 ML | Refills: 0 | Status: SHIPPED | OUTPATIENT
Start: 2024-07-16

## 2024-07-16 RX ORDER — FOLIC ACID 1 MG/1
1 TABLET ORAL
COMMUNITY
Start: 2024-07-02

## 2024-07-16 RX ORDER — METHOTREXATE 2.5 MG/1
TABLET ORAL
COMMUNITY
Start: 2024-07-02

## 2024-07-16 ASSESSMENT — ENCOUNTER SYMPTOMS: DIZZINESS: 1

## 2024-07-16 NOTE — LETTER
7/16/2024      Arvin Britt  [address] [place] [address]      Dear Colleague,    Thank you for referring your patient, Arvin Britt, to the [facility]. Please see a copy of my visit note below.    Chief Complaint   Patient presents with     Surgical Followup     2 week S/P COMBINED MYRINGOTOMY, INSERT TUBE Right INJECTION, STEROID DOS 7-2-24  Tinnitus is now gone in right ear.       PCP: Teresa Champion     Referring Provider: No ref. provider found    There were no vitals taken for this visit.    ENT Problem List:  Patient Active Problem List   Diagnosis     Meniere's disease, right      Current Medications:  Current Outpatient Medications   Medication Sig Dispense Refill     amLODIPine (NORVASC) 5 MG tablet Take 10 mg by mouth daily       aspirin (ASA) 81 MG EC tablet Take 81 mg by mouth daily       atorvastatin (LIPITOR) 20 MG tablet Take 20 mg by mouth At Bedtime       diazepam (VALIUM) 5 MG tablet Take 1 tablet (5 mg) by mouth every 6 hours as needed for anxiety, vomiting, nausea or seizures 30 tablet 0     fish oil-omega-3 fatty acids 1000 MG capsule Take 2 g by mouth daily       folic acid (FOLVITE) 1 MG tablet Take 1 mg by mouth       hydrALAZINE (APRESOLINE) 25 MG tablet Take 25 mg by mouth 3 times daily       isosorbide mononitrate (IMDUR) 30 MG 24 hr tablet Take 30 mg by mouth daily        lisinopril (PRINIVIL,ZESTRIL) 40 MG tablet Take 40 mg by mouth daily       MECLIZINE HCL PO Take 25 mg by mouth daily       methotrexate 2.5 MG tablet Take 6 tablets by mouth on Sundays.  Have blood drawn 5 days after first dose.       metoprolol tartrate (LOPRESSOR) 100 MG tablet Take 100 mg by mouth daily        Multiple Vitamin (MULTIVITAMIN OR) Take by mouth daily       nitroglycerin (NITROSTAT) 0.3 MG SL tablet Place 0.3 mg under the tongue every 5 minutes as needed       pantoprazole (PROTONIX) 40 MG EC tablet 2 times daily        triamterene-HCTZ (DYAZIDE) 37.5-25 MG  capsule Take 1 capsule by mouth every morning 30 capsule 3     cholecalciferol 1000 UNIT TABS Take  by mouth daily.         ipratropium (ATROVENT) 0.03 % nasal spray Spray 2 sprays into both nostrils 3 times daily 30 mL 6     omeprazole (PRILOSEC) 40 MG DR capsule Take 40 mg by mouth daily       triamterene-HCTZ (DYAZIDE) 37.5-25 MG capsule Take by mouth 2 times daily       No current facility-administered medications for this visit.     HPI  Pleasant 74 year old male with his wife presents today as a(n) established patient for 2 week s/p combined myringotomy, insert tube (right ear), injection, steroid (right ear) DOS 7/2/24. He reports that he had a very slight dizziness when waking up one morning a couple of days after the surgery, but otherwise has not had problems. He describes the dizziness as positional. He states that he is on several blood pressure medications.  He reports that his tinnitus has gone down in his right ear.  He reports that he wears hearing aids and is happy with them. He reports a rubbing sound with his hearing aids at times, mostly in the left ear. Therefore, he uses double sided tape which helps keeps the hearing aids in place.  He reports a hx of cataract surgery and due to this surgery, he does not have to wear glasses.    Review of Systems   Constitutional: Negative.    HENT:  Positive for hearing loss and tinnitus.    Eyes: Negative.    Respiratory: Negative.     Gastrointestinal: Negative.    Skin: Negative.    Neurological:  Positive for dizziness.   Endo/Heme/Allergies: Negative.        Physical Exam  Vitals and nursing note reviewed.   Constitutional:       Appearance: Normal appearance.   HENT:      Head: Normocephalic and atraumatic.      Jaw: There is normal jaw occlusion.      Right Ear: Tympanic membrane and ear canal normal. Decreased hearing noted. A PE tube is present.      Left Ear: Tympanic membrane and ear canal normal. Decreased hearing noted.      Nose: No mucosal  edema, congestion or rhinorrhea.      Right Nostril: No occlusion.      Left Nostril: No occlusion.      Right Turbinates: Not enlarged or swollen.      Left Turbinates: Not enlarged or swollen.      Right Sinus: No maxillary sinus tenderness or frontal sinus tenderness.      Left Sinus: No maxillary sinus tenderness or frontal sinus tenderness.      Mouth/Throat:      Mouth: Mucous membranes are moist.      Pharynx: Oropharynx is clear. Uvula midline.   Eyes:      Extraocular Movements: Extraocular movements intact.      Pupils: Pupils are equal, round, and reactive to light.   Neurological:      Mental Status: He is alert.       A/P  This pleasant patient is 2 week s/p combined myringotomy, insert tube (right ear), injection, steroid (right ear) DOS 7/2/24 and has right Meniere's disease. There are no ear, sinus, or throat infections present. The right PE tube is open and functioning properly. The option of another intratympanic steroid injection in the right ear if he has more dizzy spells is discussed.     He is prescribed ciprofloxacin-dexAMETHasone (CIPRODEX) 0.3-0.1 % otic suspension, place 4 drops into the right ear 2 times daily, today to use in case he experiences otorrhea or otalgia in the right ear due to OM.     Questions and concerns were addressed.    Follow up in clinic in 4-5 months or earlier prn.    Scribe/Staff:    Scribe Disclosure:   I, Bindu Guerrero, am serving as a scribe; to document services personally performed by Brandi Rouse MD based on data collection and the provider's statements to me.     Provider Disclosure:  I agree with above History, Review of Systems, Physical exam and Plan.  I have reviewed the content of the documentation and have edited it as needed. I have personally performed the services documented here and the documentation accurately represents those services and the decisions I have made.      Electronically signed by:  Brandi Rouse MD         Again,  thank you for allowing me to participate in the care of your patient.        Sincerely,        Brandi Rouse MD

## 2024-07-16 NOTE — NURSING NOTE
Arvin Britt's chief complaint for this visit includes:  Chief Complaint   Patient presents with    Surgical Followup     2 week S/P COMBINED MYRINGOTOMY, INSERT TUBE Right INJECTION, STEROID DOS 7-2-24  Tinnitus is now gone in right ear.      PCP: Teresa Champion    Referring Provider:  Referred Self, MD  No address on file    There were no vitals taken for this visit.

## 2024-09-10 ENCOUNTER — TRANSFERRED RECORDS (OUTPATIENT)
Dept: HEALTH INFORMATION MANAGEMENT | Facility: CLINIC | Age: 74
End: 2024-09-10
Payer: MEDICARE

## 2024-09-17 ENCOUNTER — OFFICE VISIT (OUTPATIENT)
Dept: OTOLARYNGOLOGY | Facility: CLINIC | Age: 74
End: 2024-09-17
Payer: MEDICARE

## 2024-09-17 DIAGNOSIS — H81.03 MENIERE'S DISEASE, BILATERAL: Primary | ICD-10-CM

## 2024-09-17 DIAGNOSIS — H90.3 SENSORINEURAL HEARING LOSS (SNHL) OF BOTH EARS: ICD-10-CM

## 2024-09-17 RX ORDER — DIMENHYDRINATE 50 MG
50 TABLET ORAL DAILY PRN
Qty: 60 TABLET | Refills: 0 | Status: SHIPPED | OUTPATIENT
Start: 2024-09-17

## 2024-09-17 RX ORDER — MECLIZINE HYDROCHLORIDE 25 MG/1
25 TABLET ORAL 3 TIMES DAILY PRN
Qty: 60 TABLET | Refills: 0 | Status: SHIPPED | OUTPATIENT
Start: 2024-09-17

## 2024-09-17 NOTE — NURSING NOTE
Arvin Britt's chief complaint for this visit includes:  Chief Complaint   Patient presents with    Follow Up     Dizziness for past 10 days, happening every other day, with some ringing in the ear. Right ear.      PCP: Teresa Champion    Referring Provider:  Referred Self, MD  No address on file    There were no vitals taken for this visit.

## 2024-09-17 NOTE — LETTER
9/17/2024      Arvin Britt  [address]  [place] [address]      Dear Colleague,    Thank you for referring your patient, Arvin Britt, to the [facility]. Please see a copy of my visit note below.    Chief Complaint   Patient presents with   Arvin Britt's chief complaint for this visit includes:  Chief Complaint   Patient presents with     Follow Up     Dizziness for past 10 days, happening every other day, with some ringing in the ear. Right ear.      PCP: Teresa Champion    Referring Provider:  Referred Self, MD  No address on file    There were no vitals taken for this visit.        ENT Problem List:  Patient Active Problem List   Diagnosis     Meniere's disease, right      Current Medications:  Current Outpatient Medications   Medication Sig Dispense Refill     amLODIPine (NORVASC) 5 MG tablet Take 10 mg by mouth daily       aspirin (ASA) 81 MG EC tablet Take 81 mg by mouth daily       atorvastatin (LIPITOR) 20 MG tablet Take 20 mg by mouth At Bedtime       diazepam (VALIUM) 5 MG tablet Take 1 tablet (5 mg) by mouth every 6 hours as needed for anxiety, vomiting, nausea or seizures 30 tablet 0     fish oil-omega-3 fatty acids 1000 MG capsule Take 2 g by mouth daily       folic acid (FOLVITE) 1 MG tablet Take 1 mg by mouth       hydrALAZINE (APRESOLINE) 25 MG tablet Take 25 mg by mouth 3 times daily       isosorbide mononitrate (IMDUR) 30 MG 24 hr tablet Take 30 mg by mouth daily        lisinopril (PRINIVIL,ZESTRIL) 40 MG tablet Take 40 mg by mouth daily       MECLIZINE HCL PO Take 25 mg by mouth daily       methotrexate 2.5 MG tablet Take 6 tablets by mouth on Sundays.  Have blood drawn 5 days after first dose.       metoprolol tartrate (LOPRESSOR) 100 MG tablet Take 100 mg by mouth daily        Multiple Vitamin (MULTIVITAMIN OR) Take by mouth daily       nitroglycerin (NITROSTAT) 0.3 MG SL tablet Place 0.3 mg under the tongue every 5 minutes as needed        pantoprazole (PROTONIX) 40 MG EC tablet 2 times daily        triamterene-HCTZ (DYAZIDE) 37.5-25 MG capsule Take 1 capsule by mouth every morning 30 capsule 3     cholecalciferol 1000 UNIT TABS Take  by mouth daily.         ipratropium (ATROVENT) 0.03 % nasal spray Spray 2 sprays into both nostrils 3 times daily 30 mL 6     omeprazole (PRILOSEC) 40 MG DR capsule Take 40 mg by mouth daily       triamterene-HCTZ (DYAZIDE) 37.5-25 MG capsule Take by mouth 2 times daily       No current facility-administered medications for this visit.     HPI  Pleasant 74 year old male with his wife presents today as a(n) established patient for recent vertigo episodes that occurred 3 times in the past 10 days. Occurred in the middle of the night with nausea/vomiting, full spinning that lasted several hours. His hearing is fluctuating. He is a s/p combined myringotomy, insert tube (right ear), injection, steroid (right ear) DOS 7/2/24. He states that he is on several blood pressure medications.  He reports that his tinnitus has gone down in his right ear.  He reports that he wears hearing aids and is happy with them. He reports a rubbing sound with his hearing aids at times, mostly in the left ear. Therefore, he uses double sided tape which helps keeps the hearing aids in place.  He reports a hx of cataract surgery and due to this surgery, he does not have to wear glasses.    Review of Systems   Constitutional: Negative.    HENT:  Positive for hearing loss and tinnitus.    Eyes: Negative.    Respiratory: Negative.     Gastrointestinal: Negative.    Skin: Negative.    Neurological:  Positive for dizziness.   Endo/Heme/Allergies: Negative.        Physical Exam  Vitals and nursing note reviewed.   Constitutional:       Appearance: Normal appearance.   HENT:      Head: Normocephalic and atraumatic.      Jaw: There is normal jaw occlusion.      Right Ear: Tympanic membrane and ear canal normal. Decreased hearing noted. A PE tube is present.       Left Ear: Tympanic membrane and ear canal normal. Decreased hearing noted.      Nose: No mucosal edema, congestion or rhinorrhea.      Right Nostril: No occlusion.      Left Nostril: No occlusion.      Right Turbinates: Not enlarged or swollen.      Left Turbinates: Not enlarged or swollen.      Right Sinus: No maxillary sinus tenderness or frontal sinus tenderness.      Left Sinus: No maxillary sinus tenderness or frontal sinus tenderness.      Mouth/Throat:      Mouth: Mucous membranes are moist.      Pharynx: Oropharynx is clear. Uvula midline.   Eyes:      Extraocular Movements: Extraocular movements intact.      Pupils: Pupils are equal, round, and reactive to light.   Neurological:      Mental Status: He is alert.     Surgery:  Myringotomy, right:Intratympanic dexamethasone inoculation  Diagnosis:  1. Bilateral Meniere's disease       He was seen in the room and identified. Pros and cons of the procedure were explained to the patient. The procedure and its alternatives were explained to the patient in lay terms. His questions were answered. His symptoms required a myringotomy under local anesthesia. After obtaining an informed consent from the patient, 1 ml of 24 mg. Dexamethasone applied to the middle ear through the open lumen of the PE tube which was 1.27 mm. He tolerated the procedure well and left the room with no complications. He will be seen in the f/u.    A/P  This pleasant patient is s/p combined myringotomy, insert tube (right ear), injection, steroid (right ear) and has right Meniere's disease. There are no ear, sinus, or throat infections present. The right PE tube is open and functioning properly. The option of another intratympanic steroid injection in the right ear if he has more dizzy spells is discussed.     Questions and concerns were addressed.    Follow up in clinic in a week or earlier prn.           Again, thank you for allowing me to participate in the care of your patient.         Sincerely,        Brandi Rouse MD

## 2024-09-17 NOTE — PROGRESS NOTES
Chief Complaint   Patient presents with   Arvin Britt's chief complaint for this visit includes:  Chief Complaint   Patient presents with    Follow Up     Dizziness for past 10 days, happening every other day, with some ringing in the ear. Right ear.      PCP: Teresa Champion    Referring Provider:  Referred Self, MD  No address on file    There were no vitals taken for this visit.        ENT Problem List:  Patient Active Problem List   Diagnosis    Meniere's disease, right      Current Medications:  Current Outpatient Medications   Medication Sig Dispense Refill    amLODIPine (NORVASC) 5 MG tablet Take 10 mg by mouth daily      aspirin (ASA) 81 MG EC tablet Take 81 mg by mouth daily      atorvastatin (LIPITOR) 20 MG tablet Take 20 mg by mouth At Bedtime      diazepam (VALIUM) 5 MG tablet Take 1 tablet (5 mg) by mouth every 6 hours as needed for anxiety, vomiting, nausea or seizures 30 tablet 0    fish oil-omega-3 fatty acids 1000 MG capsule Take 2 g by mouth daily      folic acid (FOLVITE) 1 MG tablet Take 1 mg by mouth      hydrALAZINE (APRESOLINE) 25 MG tablet Take 25 mg by mouth 3 times daily      isosorbide mononitrate (IMDUR) 30 MG 24 hr tablet Take 30 mg by mouth daily       lisinopril (PRINIVIL,ZESTRIL) 40 MG tablet Take 40 mg by mouth daily      MECLIZINE HCL PO Take 25 mg by mouth daily      methotrexate 2.5 MG tablet Take 6 tablets by mouth on Sundays.  Have blood drawn 5 days after first dose.      metoprolol tartrate (LOPRESSOR) 100 MG tablet Take 100 mg by mouth daily       Multiple Vitamin (MULTIVITAMIN OR) Take by mouth daily      nitroglycerin (NITROSTAT) 0.3 MG SL tablet Place 0.3 mg under the tongue every 5 minutes as needed      pantoprazole (PROTONIX) 40 MG EC tablet 2 times daily       triamterene-HCTZ (DYAZIDE) 37.5-25 MG capsule Take 1 capsule by mouth every morning 30 capsule 3    cholecalciferol 1000 UNIT TABS Take  by mouth daily.        ipratropium (ATROVENT) 0.03 % nasal spray  Spray 2 sprays into both nostrils 3 times daily 30 mL 6    omeprazole (PRILOSEC) 40 MG DR capsule Take 40 mg by mouth daily      triamterene-HCTZ (DYAZIDE) 37.5-25 MG capsule Take by mouth 2 times daily       No current facility-administered medications for this visit.     HPI  Pleasant 74 year old male with his wife presents today as a(n) established patient for recent vertigo episodes that occurred 3 times in the past 10 days. Occurred in the middle of the night with nausea/vomiting, full spinning that lasted several hours. His hearing is fluctuating. He is a s/p combined myringotomy, insert tube (right ear), injection, steroid (right ear) DOS 7/2/24. He states that he is on several blood pressure medications.  He reports that his tinnitus has gone down in his right ear.  He reports that he wears hearing aids and is happy with them. He reports a rubbing sound with his hearing aids at times, mostly in the left ear. Therefore, he uses double sided tape which helps keeps the hearing aids in place.  He reports a hx of cataract surgery and due to this surgery, he does not have to wear glasses.    Review of Systems   Constitutional: Negative.    HENT:  Positive for hearing loss and tinnitus.    Eyes: Negative.    Respiratory: Negative.     Gastrointestinal: Negative.    Skin: Negative.    Neurological:  Positive for dizziness.   Endo/Heme/Allergies: Negative.        Physical Exam  Vitals and nursing note reviewed.   Constitutional:       Appearance: Normal appearance.   HENT:      Head: Normocephalic and atraumatic.      Jaw: There is normal jaw occlusion.      Right Ear: Tympanic membrane and ear canal normal. Decreased hearing noted. A PE tube is present.      Left Ear: Tympanic membrane and ear canal normal. Decreased hearing noted.      Nose: No mucosal edema, congestion or rhinorrhea.      Right Nostril: No occlusion.      Left Nostril: No occlusion.      Right Turbinates: Not enlarged or swollen.      Left  Turbinates: Not enlarged or swollen.      Right Sinus: No maxillary sinus tenderness or frontal sinus tenderness.      Left Sinus: No maxillary sinus tenderness or frontal sinus tenderness.      Mouth/Throat:      Mouth: Mucous membranes are moist.      Pharynx: Oropharynx is clear. Uvula midline.   Eyes:      Extraocular Movements: Extraocular movements intact.      Pupils: Pupils are equal, round, and reactive to light.   Neurological:      Mental Status: He is alert.     Surgery:  Myringotomy, right:Intratympanic dexamethasone inoculation  Diagnosis:  1. Bilateral Meniere's disease       He was seen in the room and identified. Pros and cons of the procedure were explained to the patient. The procedure and its alternatives were explained to the patient in lay terms. His questions were answered. His symptoms required a myringotomy under local anesthesia. After obtaining an informed consent from the patient, 1 ml of 24 mg. Dexamethasone applied to the middle ear through the open lumen of the PE tube which was 1.27 mm. He tolerated the procedure well and left the room with no complications. He will be seen in the f/u.    A/P  This pleasant patient is s/p combined myringotomy, insert tube (right ear), injection, steroid (right ear) and has right Meniere's disease. There are no ear, sinus, or throat infections present. The right PE tube is open and functioning properly. The option of another intratympanic steroid injection in the right ear if he has more dizzy spells is discussed.     Questions and concerns were addressed.    Follow up in clinic in a week or earlier prn.

## 2024-09-24 ENCOUNTER — OFFICE VISIT (OUTPATIENT)
Dept: OTOLARYNGOLOGY | Facility: CLINIC | Age: 74
End: 2024-09-24
Payer: MEDICARE

## 2024-09-24 DIAGNOSIS — H90.3 SENSORINEURAL HEARING LOSS (SNHL) OF BOTH EARS: ICD-10-CM

## 2024-09-24 DIAGNOSIS — H81.03 MENIERE'S DISEASE, BILATERAL: Primary | ICD-10-CM

## 2024-09-24 DIAGNOSIS — H69.91 DYSFUNCTION OF RIGHT EUSTACHIAN TUBE: ICD-10-CM

## 2024-09-24 DIAGNOSIS — H81.01 MENIERE'S DISEASE, RIGHT: ICD-10-CM

## 2024-09-24 NOTE — LETTER
9/24/2024      Arvin Britt  [address]  [place] [address]      Dear Colleague,    Thank you for referring your patient, Arvin Britt, to the [facility]. Please see a copy of my visit note below.    Chief Complaint   Patient presents with   Arvin Britt's chief complaint for this visit includes:  Chief Complaint   Patient presents with     Follow Up     Dizziness for past 10 days, happening every other day, with some ringing in the ear. Right ear.      PCP: Teresa Champion    Referring Provider:  Referred Self, MD  No address on file    There were no vitals taken for this visit.        ENT Problem List:  Patient Active Problem List   Diagnosis     Meniere's disease, right      Current Medications:  Current Outpatient Medications   Medication Sig Dispense Refill     amLODIPine (NORVASC) 5 MG tablet Take 10 mg by mouth daily       aspirin (ASA) 81 MG EC tablet Take 81 mg by mouth daily       atorvastatin (LIPITOR) 20 MG tablet Take 20 mg by mouth At Bedtime       diazepam (VALIUM) 5 MG tablet Take 1 tablet (5 mg) by mouth every 6 hours as needed for anxiety, vomiting, nausea or seizures 30 tablet 0     fish oil-omega-3 fatty acids 1000 MG capsule Take 2 g by mouth daily       folic acid (FOLVITE) 1 MG tablet Take 1 mg by mouth       hydrALAZINE (APRESOLINE) 25 MG tablet Take 25 mg by mouth 3 times daily       isosorbide mononitrate (IMDUR) 30 MG 24 hr tablet Take 30 mg by mouth daily        lisinopril (PRINIVIL,ZESTRIL) 40 MG tablet Take 40 mg by mouth daily       MECLIZINE HCL PO Take 25 mg by mouth daily       methotrexate 2.5 MG tablet Take 6 tablets by mouth on Sundays.  Have blood drawn 5 days after first dose.       metoprolol tartrate (LOPRESSOR) 100 MG tablet Take 100 mg by mouth daily        Multiple Vitamin (MULTIVITAMIN OR) Take by mouth daily       nitroglycerin (NITROSTAT) 0.3 MG SL tablet Place 0.3 mg under the tongue every 5 minutes as needed        pantoprazole (PROTONIX) 40 MG EC tablet 2 times daily        triamterene-HCTZ (DYAZIDE) 37.5-25 MG capsule Take 1 capsule by mouth every morning 30 capsule 3     cholecalciferol 1000 UNIT TABS Take  by mouth daily.         ipratropium (ATROVENT) 0.03 % nasal spray Spray 2 sprays into both nostrils 3 times daily 30 mL 6     omeprazole (PRILOSEC) 40 MG DR capsule Take 40 mg by mouth daily       triamterene-HCTZ (DYAZIDE) 37.5-25 MG capsule Take by mouth 2 times daily       No current facility-administered medications for this visit.     HPI  Arvin Britt's chief complaint for this visit includes:  Chief Complaint   Patient presents with     Follow Up     Meniere's disease, no improvements. Had 1st dexa injection in the right ear at LOV on 9/17/24. Since last week, he had 2 vertigo episodes, one on Friday night lasting all day Saturday with dizziness and nausea, and then another episode last night. He is unsure if he wants to have the 2nd dexa injection today. Has an upcoming trip to [place] which is less than 2 weeks away.     PCP: Teresa Champion    Referring Provider:  Referred Self, MD  No address on file    There were no vitals taken for this visit.        Pleasant 74 year old male with his wife presents today as a(n) established patient for recent vertigo episodes that occurred 3 times in the past 10 days. Occurred in the middle of the night with nausea/vomiting, full spinning that lasted several hours. His hearing is fluctuating. He is a s/p combined myringotomy, insert tube (right ear), injection, steroid (right ear) DOS 7/2/24. He states that he is on several blood pressure medications.  He reports that his tinnitus has gone down in his right ear.  He reports that he wears hearing aids and is happy with them. He reports a rubbing sound with his hearing aids at times, mostly in the left ear. Therefore, he uses double sided tape which helps keeps the hearing aids in place.  He reports a hx of cataract  surgery and due to this surgery, he does not have to wear glasses.    Review of Systems   Constitutional: Negative.    HENT:  Positive for hearing loss and tinnitus.    Eyes: Negative.    Respiratory: Negative.     Gastrointestinal: Negative.    Skin: Negative.    Neurological:  Positive for dizziness.   Endo/Heme/Allergies: Negative.        Physical Exam  Vitals and nursing note reviewed.   Constitutional:       Appearance: Normal appearance.   HENT:      Head: Normocephalic and atraumatic.      Jaw: There is normal jaw occlusion.      Right Ear: Tympanic membrane and ear canal normal. Decreased hearing noted. A PE tube is present.      Left Ear: Tympanic membrane and ear canal normal. Decreased hearing noted.      Nose: No mucosal edema, congestion or rhinorrhea.      Right Nostril: No occlusion.      Left Nostril: No occlusion.      Right Turbinates: Not enlarged or swollen.      Left Turbinates: Not enlarged or swollen.      Right Sinus: No maxillary sinus tenderness or frontal sinus tenderness.      Left Sinus: No maxillary sinus tenderness or frontal sinus tenderness.      Mouth/Throat:      Mouth: Mucous membranes are moist.      Pharynx: Oropharynx is clear. Uvula midline.   Eyes:      Extraocular Movements: Extraocular movements intact.      Pupils: Pupils are equal, round, and reactive to light.   Neurological:      Mental Status: He is alert.     A/P  This pleasant patient is s/p combined myringotomy, insert tube (right ear), injection, steroid (right ear) and has right Meniere's disease. The right PE tube is open and functioning properly. The option of another intratympanic steroid injection in the right ear or systemic steroid tapering for 6 days if he has more dizzy spells is discussed.     Questions and concerns were addressed.    Follow up in clinic in a week or earlier prn.           Again, thank you for allowing me to participate in the care of your patient.        Sincerely,        Brandi  MD Padma

## 2024-09-24 NOTE — NURSING NOTE
Arvin Britt's chief complaint for this visit includes:  Chief Complaint   Patient presents with    Follow Up     Meniere's disease, no improvements. Had 1st dexa injection in the right ear at LOV on 9/17/24. Since last week, he had 2 vertigo episodes, one on Friday night lasting all day Saturday with dizziness and nausea, and then another episode last night. He is unsure if he wants to have the 2nd dexa injection today. Has an upcoming trip to [place] which is less than 2 weeks away.     PCP: Teresa Champion    Referring Provider:  Referred Self, MD  No address on file    There were no vitals taken for this visit.        Desmond Middleton, EMT  September 24, 2024

## 2024-09-24 NOTE — PROGRESS NOTES
Chief Complaint   Patient presents with   Arvin Britt's chief complaint for this visit includes:  Chief Complaint   Patient presents with    Follow Up     Dizziness for past 10 days, happening every other day, with some ringing in the ear. Right ear.      PCP: Teresa Champion    Referring Provider:  Referred Self, MD  No address on file    There were no vitals taken for this visit.        ENT Problem List:  Patient Active Problem List   Diagnosis    Meniere's disease, right      Current Medications:  Current Outpatient Medications   Medication Sig Dispense Refill    amLODIPine (NORVASC) 5 MG tablet Take 10 mg by mouth daily      aspirin (ASA) 81 MG EC tablet Take 81 mg by mouth daily      atorvastatin (LIPITOR) 20 MG tablet Take 20 mg by mouth At Bedtime      diazepam (VALIUM) 5 MG tablet Take 1 tablet (5 mg) by mouth every 6 hours as needed for anxiety, vomiting, nausea or seizures 30 tablet 0    fish oil-omega-3 fatty acids 1000 MG capsule Take 2 g by mouth daily      folic acid (FOLVITE) 1 MG tablet Take 1 mg by mouth      hydrALAZINE (APRESOLINE) 25 MG tablet Take 25 mg by mouth 3 times daily      isosorbide mononitrate (IMDUR) 30 MG 24 hr tablet Take 30 mg by mouth daily       lisinopril (PRINIVIL,ZESTRIL) 40 MG tablet Take 40 mg by mouth daily      MECLIZINE HCL PO Take 25 mg by mouth daily      methotrexate 2.5 MG tablet Take 6 tablets by mouth on Sundays.  Have blood drawn 5 days after first dose.      metoprolol tartrate (LOPRESSOR) 100 MG tablet Take 100 mg by mouth daily       Multiple Vitamin (MULTIVITAMIN OR) Take by mouth daily      nitroglycerin (NITROSTAT) 0.3 MG SL tablet Place 0.3 mg under the tongue every 5 minutes as needed      pantoprazole (PROTONIX) 40 MG EC tablet 2 times daily       triamterene-HCTZ (DYAZIDE) 37.5-25 MG capsule Take 1 capsule by mouth every morning 30 capsule 3    cholecalciferol 1000 UNIT TABS Take  by mouth daily.        ipratropium (ATROVENT) 0.03 % nasal spray  Spray 2 sprays into both nostrils 3 times daily 30 mL 6    omeprazole (PRILOSEC) 40 MG DR capsule Take 40 mg by mouth daily      triamterene-HCTZ (DYAZIDE) 37.5-25 MG capsule Take by mouth 2 times daily       No current facility-administered medications for this visit.     HPI  Arvin Pradipkareen Britt's chief complaint for this visit includes:  Chief Complaint   Patient presents with    Follow Up     Meniere's disease, no improvements. Had 1st dexa injection in the right ear at LOV on 9/17/24. Since last week, he had 2 vertigo episodes, one on Friday night lasting all day Saturday with dizziness and nausea, and then another episode last night. He is unsure if he wants to have the 2nd dexa injection today. Has an upcoming trip to [place] which is less than 2 weeks away.     PCP: Teresa Champion    Referring Provider:  Referred Self, MD  No address on file    There were no vitals taken for this visit.        Pleasant 74 year old male with his wife presents today as a(n) established patient for recent vertigo episodes that occurred 3 times in the past 10 days. Occurred in the middle of the night with nausea/vomiting, full spinning that lasted several hours. His hearing is fluctuating. He is a s/p combined myringotomy, insert tube (right ear), injection, steroid (right ear) DOS 7/2/24. He states that he is on several blood pressure medications.  He reports that his tinnitus has gone down in his right ear.  He reports that he wears hearing aids and is happy with them. He reports a rubbing sound with his hearing aids at times, mostly in the left ear. Therefore, he uses double sided tape which helps keeps the hearing aids in place.  He reports a hx of cataract surgery and due to this surgery, he does not have to wear glasses.    Review of Systems   Constitutional: Negative.    HENT:  Positive for hearing loss and tinnitus.    Eyes: Negative.    Respiratory: Negative.     Gastrointestinal: Negative.    Skin: Negative.     Neurological:  Positive for dizziness.   Endo/Heme/Allergies: Negative.        Physical Exam  Vitals and nursing note reviewed.   Constitutional:       Appearance: Normal appearance.   HENT:      Head: Normocephalic and atraumatic.      Jaw: There is normal jaw occlusion.      Right Ear: Tympanic membrane and ear canal normal. Decreased hearing noted. A PE tube is present.      Left Ear: Tympanic membrane and ear canal normal. Decreased hearing noted.      Nose: No mucosal edema, congestion or rhinorrhea.      Right Nostril: No occlusion.      Left Nostril: No occlusion.      Right Turbinates: Not enlarged or swollen.      Left Turbinates: Not enlarged or swollen.      Right Sinus: No maxillary sinus tenderness or frontal sinus tenderness.      Left Sinus: No maxillary sinus tenderness or frontal sinus tenderness.      Mouth/Throat:      Mouth: Mucous membranes are moist.      Pharynx: Oropharynx is clear. Uvula midline.   Eyes:      Extraocular Movements: Extraocular movements intact.      Pupils: Pupils are equal, round, and reactive to light.   Neurological:      Mental Status: He is alert.     A/P  This pleasant patient is s/p combined myringotomy, insert tube (right ear), injection, steroid (right ear) and has right Meniere's disease. The right PE tube is open and functioning properly. The option of another intratympanic steroid injection in the right ear or systemic steroid tapering for 6 days if he has more dizzy spells is discussed.     Questions and concerns were addressed.    Follow up in clinic in a week or earlier prn.

## 2024-10-02 ENCOUNTER — OFFICE VISIT (OUTPATIENT)
Dept: OTOLARYNGOLOGY | Facility: CLINIC | Age: 74
End: 2024-10-02
Payer: MEDICARE

## 2024-10-02 DIAGNOSIS — H81.01 MENIERE'S DISEASE, RIGHT: Primary | ICD-10-CM

## 2024-10-02 DIAGNOSIS — H90.3 SENSORINEURAL HEARING LOSS (SNHL) OF BOTH EARS: ICD-10-CM

## 2024-10-02 PROCEDURE — 99213 OFFICE O/P EST LOW 20 MIN: CPT | Performed by: OTOLARYNGOLOGY

## 2024-10-02 RX ORDER — METHYLPREDNISOLONE 4 MG/1
TABLET ORAL
Qty: 21 TABLET | Refills: 0 | Status: SHIPPED | OUTPATIENT
Start: 2024-10-02

## 2024-10-02 NOTE — PROGRESS NOTES
Chief Complaint   Patient presents with   Arvin Britt's chief complaint for this visit includes:  Chief Complaint   Patient presents with    Follow Up     Dizziness for past 10 days, happening every other day, with some ringing in the ear. Right ear.      PCP: Teresa Champion    Referring Provider:  Referred Self, MD  No address on file    There were no vitals taken for this visit.        ENT Problem List:  Patient Active Problem List   Diagnosis    Meniere's disease, right      Current Medications:  Current Outpatient Medications   Medication Sig Dispense Refill    amLODIPine (NORVASC) 5 MG tablet Take 10 mg by mouth daily      aspirin (ASA) 81 MG EC tablet Take 81 mg by mouth daily      atorvastatin (LIPITOR) 20 MG tablet Take 20 mg by mouth At Bedtime      diazepam (VALIUM) 5 MG tablet Take 1 tablet (5 mg) by mouth every 6 hours as needed for anxiety, vomiting, nausea or seizures 30 tablet 0    fish oil-omega-3 fatty acids 1000 MG capsule Take 2 g by mouth daily      folic acid (FOLVITE) 1 MG tablet Take 1 mg by mouth      hydrALAZINE (APRESOLINE) 25 MG tablet Take 25 mg by mouth 3 times daily      isosorbide mononitrate (IMDUR) 30 MG 24 hr tablet Take 30 mg by mouth daily       lisinopril (PRINIVIL,ZESTRIL) 40 MG tablet Take 40 mg by mouth daily      MECLIZINE HCL PO Take 25 mg by mouth daily      methotrexate 2.5 MG tablet Take 6 tablets by mouth on Sundays.  Have blood drawn 5 days after first dose.      metoprolol tartrate (LOPRESSOR) 100 MG tablet Take 100 mg by mouth daily       Multiple Vitamin (MULTIVITAMIN OR) Take by mouth daily      nitroglycerin (NITROSTAT) 0.3 MG SL tablet Place 0.3 mg under the tongue every 5 minutes as needed      pantoprazole (PROTONIX) 40 MG EC tablet 2 times daily       triamterene-HCTZ (DYAZIDE) 37.5-25 MG capsule Take 1 capsule by mouth every morning 30 capsule 3    cholecalciferol 1000 UNIT TABS Take  by mouth daily.        ipratropium (ATROVENT) 0.03 % nasal spray  Spray 2 sprays into both nostrils 3 times daily 30 mL 6    omeprazole (PRILOSEC) 40 MG DR capsule Take 40 mg by mouth daily      triamterene-HCTZ (DYAZIDE) 37.5-25 MG capsule Take by mouth 2 times daily       No current facility-administered medications for this visit.     HPI  Arvin Pradipkareen Britt's chief complaint for this visit includes:  Chief Complaint   Patient presents with    Follow Up     Meniere's disease, no improvements. Had 1st dexa injection in the right ear at LOV on 9/17/24. Since last week, he had 2 vertigo episodes, one on Friday night lasting all day Saturday with dizziness and nausea, and then another episode last night. He is unsure if he wants to have the 2nd dexa injection today. Has an upcoming trip to [place] which is less than 2 weeks away.     PCP: Teresa Champion    Referring Provider:  Referred Self, MD  No address on file    There were no vitals taken for this visit.        Pleasant 74 year old male with his wife presents today as a(n) established patient for recent vertigo episodes that occurred 3 times in the past 10 days. Occurred in the middle of the night with nausea/vomiting, full spinning that lasted several hours. His hearing is fluctuating. He is a s/p combined myringotomy, insert tube (right ear), injection, steroid (right ear) DOS 7/2/24. He states that he is on several blood pressure medications.  He reports that his tinnitus has gone down in his right ear.  He reports that he wears hearing aids and is happy with them. He reports a rubbing sound with his hearing aids at times, mostly in the left ear. Therefore, he uses double sided tape which helps keeps the hearing aids in place.  He reports a hx of cataract surgery and due to this surgery, he does not have to wear glasses.    Review of Systems   Constitutional: Negative.    HENT:  Positive for hearing loss and tinnitus.    Eyes: Negative.    Respiratory: Negative.     Gastrointestinal: Negative.    Skin: Negative.     Neurological:  Positive for dizziness.   Endo/Heme/Allergies: Negative.        Physical Exam  Vitals and nursing note reviewed.   Constitutional:       Appearance: Normal appearance.   HENT:      Head: Normocephalic and atraumatic.      Jaw: There is normal jaw occlusion.      Right Ear: Tympanic membrane and ear canal normal. Decreased hearing noted. A PE tube is present.      Left Ear: Tympanic membrane and ear canal normal. Decreased hearing noted.      Nose: No mucosal edema, congestion or rhinorrhea.      Right Nostril: No occlusion.      Left Nostril: No occlusion.      Right Turbinates: Not enlarged or swollen.      Left Turbinates: Not enlarged or swollen.      Right Sinus: No maxillary sinus tenderness or frontal sinus tenderness.      Left Sinus: No maxillary sinus tenderness or frontal sinus tenderness.      Mouth/Throat:      Mouth: Mucous membranes are moist.      Pharynx: Oropharynx is clear. Uvula midline.   Eyes:      Extraocular Movements: Extraocular movements intact.      Pupils: Pupils are equal, round, and reactive to light.   Neurological:      Mental Status: He is alert.     A/P  This pleasant patient is s/p combined myringotomy, insert tube (right ear), injection, steroid (right ear) and has right Meniere's disease. The right PE tube is open and functioning properly. The option of another intratympanic steroid injection in the right ear or systemic steroid tapering for 6 days if he has more dizzy spells is discussed.     Questions and concerns were addressed.    Follow up in clinic in a week or earlier prn.

## 2024-10-02 NOTE — LETTER
10/2/2024      Arvin Britt  [address]  [place] [address]      Dear Colleague,    Thank you for referring your patient, Arvin Britt, to the [facility]. Please see a copy of my visit note below.    Chief Complaint   Patient presents with   Arvin Britt's chief complaint for this visit includes:  Chief Complaint   Patient presents with     Follow Up     Dizziness for past 10 days, happening every other day, with some ringing in the ear. Right ear.      PCP: Teresa Champion    Referring Provider:  Referred Self, MD  No address on file    There were no vitals taken for this visit.        ENT Problem List:  Patient Active Problem List   Diagnosis     Meniere's disease, right      Current Medications:  Current Outpatient Medications   Medication Sig Dispense Refill     amLODIPine (NORVASC) 5 MG tablet Take 10 mg by mouth daily       aspirin (ASA) 81 MG EC tablet Take 81 mg by mouth daily       atorvastatin (LIPITOR) 20 MG tablet Take 20 mg by mouth At Bedtime       diazepam (VALIUM) 5 MG tablet Take 1 tablet (5 mg) by mouth every 6 hours as needed for anxiety, vomiting, nausea or seizures 30 tablet 0     fish oil-omega-3 fatty acids 1000 MG capsule Take 2 g by mouth daily       folic acid (FOLVITE) 1 MG tablet Take 1 mg by mouth       hydrALAZINE (APRESOLINE) 25 MG tablet Take 25 mg by mouth 3 times daily       isosorbide mononitrate (IMDUR) 30 MG 24 hr tablet Take 30 mg by mouth daily        lisinopril (PRINIVIL,ZESTRIL) 40 MG tablet Take 40 mg by mouth daily       MECLIZINE HCL PO Take 25 mg by mouth daily       methotrexate 2.5 MG tablet Take 6 tablets by mouth on Sundays.  Have blood drawn 5 days after first dose.       metoprolol tartrate (LOPRESSOR) 100 MG tablet Take 100 mg by mouth daily        Multiple Vitamin (MULTIVITAMIN OR) Take by mouth daily       nitroglycerin (NITROSTAT) 0.3 MG SL tablet Place 0.3 mg under the tongue every 5 minutes as needed        pantoprazole (PROTONIX) 40 MG EC tablet 2 times daily        triamterene-HCTZ (DYAZIDE) 37.5-25 MG capsule Take 1 capsule by mouth every morning 30 capsule 3     cholecalciferol 1000 UNIT TABS Take  by mouth daily.         ipratropium (ATROVENT) 0.03 % nasal spray Spray 2 sprays into both nostrils 3 times daily 30 mL 6     omeprazole (PRILOSEC) 40 MG DR capsule Take 40 mg by mouth daily       triamterene-HCTZ (DYAZIDE) 37.5-25 MG capsule Take by mouth 2 times daily       No current facility-administered medications for this visit.     HPI  Arvin Britt's chief complaint for this visit includes:  Chief Complaint   Patient presents with     Follow Up     Meniere's disease, no improvements. Had 1st dexa injection in the right ear at LOV on 9/17/24. Since last week, he had 2 vertigo episodes, one on Friday night lasting all day Saturday with dizziness and nausea, and then another episode last night. He is unsure if he wants to have the 2nd dexa injection today. Has an upcoming trip to [place] which is less than 2 weeks away.     PCP: Teresa Champion    Referring Provider:  Referred Self, MD  No address on file    There were no vitals taken for this visit.        Pleasant 74 year old male with his wife presents today as a(n) established patient for recent vertigo episodes that occurred 3 times in the past 10 days. Occurred in the middle of the night with nausea/vomiting, full spinning that lasted several hours. His hearing is fluctuating. He is a s/p combined myringotomy, insert tube (right ear), injection, steroid (right ear) DOS 7/2/24. He states that he is on several blood pressure medications.  He reports that his tinnitus has gone down in his right ear.  He reports that he wears hearing aids and is happy with them. He reports a rubbing sound with his hearing aids at times, mostly in the left ear. Therefore, he uses double sided tape which helps keeps the hearing aids in place.  He reports a hx of cataract  surgery and due to this surgery, he does not have to wear glasses.    Review of Systems   Constitutional: Negative.    HENT:  Positive for hearing loss and tinnitus.    Eyes: Negative.    Respiratory: Negative.     Gastrointestinal: Negative.    Skin: Negative.    Neurological:  Positive for dizziness.   Endo/Heme/Allergies: Negative.        Physical Exam  Vitals and nursing note reviewed.   Constitutional:       Appearance: Normal appearance.   HENT:      Head: Normocephalic and atraumatic.      Jaw: There is normal jaw occlusion.      Right Ear: Tympanic membrane and ear canal normal. Decreased hearing noted. A PE tube is present.      Left Ear: Tympanic membrane and ear canal normal. Decreased hearing noted.      Nose: No mucosal edema, congestion or rhinorrhea.      Right Nostril: No occlusion.      Left Nostril: No occlusion.      Right Turbinates: Not enlarged or swollen.      Left Turbinates: Not enlarged or swollen.      Right Sinus: No maxillary sinus tenderness or frontal sinus tenderness.      Left Sinus: No maxillary sinus tenderness or frontal sinus tenderness.      Mouth/Throat:      Mouth: Mucous membranes are moist.      Pharynx: Oropharynx is clear. Uvula midline.   Eyes:      Extraocular Movements: Extraocular movements intact.      Pupils: Pupils are equal, round, and reactive to light.   Neurological:      Mental Status: He is alert.     A/P  This pleasant patient is s/p combined myringotomy, insert tube (right ear), injection, steroid (right ear) and has right Meniere's disease. The right PE tube is open and functioning properly. The option of another intratympanic steroid injection in the right ear or systemic steroid tapering for 6 days if he has more dizzy spells is discussed.     Questions and concerns were addressed.    Follow up in clinic in a week or earlier prn.           Again, thank you for allowing me to participate in the care of your patient.        Sincerely,        Brandi  MD Padma

## 2024-10-02 NOTE — NURSING NOTE
Arvin Britt's chief complaint for this visit includes:  Chief Complaint   Patient presents with    Follow Up     Meniere's Disease, minimal improvements. Decided to not get the 2nd Dexa injection at [facility] on 9/24/24. Since then, has had a few dizzy spells at the end of last week, issues with balance as he was unable to walk by himself and had to crawl. There was not nausea or vomiting with those episodes, and he hasn't had an episode for 3 days. He noticed having low energy throughout the day so he stopped taking Meclizine, has had no issues since and has noticed that it has helped increase his energy.      PCP: Teresa Champion    Referring Provider:  Referred Self, MD  No address on file    There were no vitals taken for this visit.      Desmond Middleton, EMT  October 2, 2024

## 2024-12-10 DIAGNOSIS — H81.03 MENIERE'S DISEASE, BILATERAL: ICD-10-CM

## 2024-12-10 DIAGNOSIS — H90.3 SENSORINEURAL HEARING LOSS (SNHL) OF BOTH EARS: ICD-10-CM

## 2024-12-10 RX ORDER — MECLIZINE HYDROCHLORIDE 25 MG/1
25 TABLET ORAL 3 TIMES DAILY PRN
Qty: 60 TABLET | Refills: 2 | Status: SHIPPED | OUTPATIENT
Start: 2024-12-10

## 2025-07-06 ENCOUNTER — HEALTH MAINTENANCE LETTER (OUTPATIENT)
Age: 75
End: 2025-07-06

## 2025-07-17 ENCOUNTER — OFFICE VISIT (OUTPATIENT)
Dept: OTOLARYNGOLOGY | Facility: CLINIC | Age: 75
End: 2025-07-17
Payer: MEDICARE

## 2025-07-17 DIAGNOSIS — H69.91 DYSFUNCTION OF RIGHT EUSTACHIAN TUBE: ICD-10-CM

## 2025-07-17 DIAGNOSIS — H61.23 BILATERAL IMPACTED CERUMEN: ICD-10-CM

## 2025-07-17 DIAGNOSIS — H90.3 SENSORINEURAL HEARING LOSS (SNHL) OF BOTH EARS: ICD-10-CM

## 2025-07-17 DIAGNOSIS — H81.03 MENIERE'S DISEASE, BILATERAL: Primary | ICD-10-CM

## 2025-07-17 PROCEDURE — 99213 OFFICE O/P EST LOW 20 MIN: CPT | Mod: 25 | Performed by: OTOLARYNGOLOGY

## 2025-07-17 PROCEDURE — 92504 EAR MICROSCOPY EXAMINATION: CPT | Performed by: OTOLARYNGOLOGY

## 2025-07-17 ASSESSMENT — ENCOUNTER SYMPTOMS
EYES NEGATIVE: 1
CONSTITUTIONAL NEGATIVE: 1
GASTROINTESTINAL NEGATIVE: 1
RESPIRATORY NEGATIVE: 1
DIZZINESS: 0

## 2025-07-17 NOTE — PROGRESS NOTES
Chief Complaint   Patient presents with    Follow Up     Ear cleaning      PCP: Teresa Champion     Referring Provider: Referred Self    There were no vitals taken for this visit.    ENT Problem List:  Patient Active Problem List   Diagnosis    Meniere's disease, right      Current Medications:  Current Outpatient Medications   Medication Sig Dispense Refill    amLODIPine (NORVASC) 5 MG tablet Take 10 mg by mouth daily      aspirin (ASA) 81 MG EC tablet Take 81 mg by mouth daily      atorvastatin (LIPITOR) 20 MG tablet Take 20 mg by mouth At Bedtime      cholecalciferol 1000 UNIT TABS Take  by mouth daily.        diazepam (VALIUM) 5 MG tablet Take 1 tablet (5 mg) by mouth every 6 hours as needed for anxiety, vomiting, nausea or seizures 30 tablet 0    dimenhyDRINATE (DRAMAMINE) 50 MG tablet Take 1 tablet (50 mg) by mouth daily as needed for sleep. 60 tablet 0    fish oil-omega-3 fatty acids 1000 MG capsule Take 2 g by mouth daily      folic acid (FOLVITE) 1 MG tablet Take 1 mg by mouth      hydrALAZINE (APRESOLINE) 25 MG tablet Take 25 mg by mouth 3 times daily      isosorbide mononitrate (IMDUR) 30 MG 24 hr tablet Take 30 mg by mouth daily       lisinopril (PRINIVIL,ZESTRIL) 40 MG tablet Take 40 mg by mouth daily      meclizine (ANTIVERT) 25 MG tablet Take 1 tablet (25 mg) by mouth 3 times daily as needed for dizziness. 60 tablet 2    methotrexate 2.5 MG tablet Take 6 tablets by mouth on Sundays.  Have blood drawn 5 days after first dose.      methylPREDNISolone (MEDROL DOSEPAK) 4 MG tablet therapy pack Follow Package Directions 21 tablet 0    metoprolol tartrate (LOPRESSOR) 100 MG tablet Take 100 mg by mouth daily       Multiple Vitamin (MULTIVITAMIN OR) Take by mouth daily      nitroglycerin (NITROSTAT) 0.3 MG SL tablet Place 0.3 mg under the tongue every 5 minutes as needed      triamterene-HCTZ (DYAZIDE) 37.5-25 MG capsule Take 1 capsule by mouth every morning 30 capsule 3    triamterene-HCTZ (DYAZIDE)  37.5-25 MG capsule Take by mouth 2 times daily      MECLIZINE HCL PO Take 25 mg by mouth daily (Patient not taking: Reported on 7/17/2025)      omeprazole (PRILOSEC) 40 MG DR capsule Take 40 mg by mouth daily      pantoprazole (PROTONIX) 40 MG EC tablet 2 times daily        No current facility-administered medications for this visit.     Surgical History:  Past Surgical History:   Procedure Laterality Date    CARDIAC STENTS[      2006    CHOLECYSTECTOMY      ENDOSCOPY      ENHANCE ENDOLYMPHATIC SAC, DEC SIGMOID SINUS, EXTND FACIAL RECESS APPRCH, MASTOIDECTOMY, BMT, COMBO  6/28/2012    Procedure: COMBINED ENHANCE ENDOLYMPHATIC SAC, DECOMPRESS SIGMOID SINUS, EXTENDED FACIAL RECESS APPROACH, COMPLETE MASTOIDECTOMY, MYRINGOTOMY, INSERT TUBE;  Right Endolympatic Sac Enchancement, Sigmoid Sinus Decompression, Extended Facial Recess Approach, Complete Mastoidectomy, Myringotomy &Tubes, Gentamycin Removal of Dermis;  Surgeon: Ricky Mueller MD;  Location: [facility]    ENHANCE ENDOLYMPHATIC SAC, DEC SIGMOID SINUS, EXTND FACIAL RECESS APPRCH, MASTOIDECTOMY, BMT, COMBO Right 12/31/2018    Procedure: Right Endolymphatic Sac Enhancement, Sigmoid Sinus Decompression, Extended Facial Recess Approach, Complete Mastoidectomy, Removal Of Scar Tissue;  Surgeon: Ricky Mueller MD;  Location: [facility] OR    ENT SURGERY      INJECT STEROID (LOCATION) Right 7/2/2024    Procedure: INJECTION, STEROID, right ear;  Surgeon: Brandi Rouse MD;  Location: [facility]    MYRINGOTOMY, INSERT TUBE, COMBINED Right 7/2/2024    Procedure: COMBINED MYRINGOTOMY, INSERT TUBE;  Surgeon: Brandi Rouse MD;  Location: [facility] OR    SURGICAL PROCEDURE FOR MENIERE'S [      2000 AND 1977    [facility] ORAL SURGERY PROCEDURE         HPI  Pleasant 75 year old male presents today as a(n) established patient for right Meniere's disease and bilateral SNHL. Was last seen by me in ENT 3/28/25 with removal of right PE tube at that time.     Today he presents for ear  cleaning. States that he recently had 3 year checkup on hearing aids and it was noted he had a cerumen buildup of dark wax. States that they do not remove wax.     Review of Systems   Constitutional: Negative.    HENT:  Positive for hearing loss. Negative for ear discharge and ear pain.    Eyes: Negative.    Respiratory: Negative.     Gastrointestinal: Negative.    Skin: Negative.    Neurological:  Negative for dizziness.   Endo/Heme/Allergies: Negative.        Physical Exam  Vitals and nursing note reviewed.   Constitutional:       Appearance: Normal appearance.   HENT:      Head: Normocephalic and atraumatic.      Right Ear: Tympanic membrane, ear canal and external ear normal. There is impacted cerumen.      Left Ear: Tympanic membrane, ear canal and external ear normal. There is impacted cerumen.      Ears:      Comments: Bilateral ear canals examined under microscope     Nose: Nose normal. No septal deviation or rhinorrhea.      Right Nostril: No occlusion.      Left Nostril: No occlusion.      Right Turbinates: Not enlarged or swollen.      Left Turbinates: Not enlarged or swollen.      Mouth/Throat:      Mouth: Mucous membranes are moist.      Pharynx: Oropharynx is clear. Uvula midline.   Eyes:      Extraocular Movements: Extraocular movements intact.      Conjunctiva/sclera: Conjunctivae normal.      Pupils: Pupils are equal, round, and reactive to light.   Neurological:      Mental Status: He is alert.     Right TM false membrane     Ear wax removal: The patient was seen in the room. An informed consent was obtained from the patient. His ears were evaluated under the microscope. Right ear canal was blocked 60% with ear wax that was suctioned. The left ear canal was blocked 30% with ear wax that was suctioned. He tolerated the procedure well and left the room no complications.    AUDIOGRAM: The patient underwent an audiogram most recently 9/10/24      A/P  This pleasant patient has right Meniere's disease  and bilateral SNHL. Audiogram and prior records were independently reviewed and discussed in detail with the patient. On examination he appears to have a cerumen impaction to bilateral ear canals so this was removed with suction today. No other concerning findings on exam and vertigo is well controlled.     Follow up in clinic in 6 months.    Scribe/Staff:    Scribe Disclosure:   I, Daria Sawant, am serving as a scribe; to document services personally performed by Brandi Rouse MD based on data collection and the provider's statements to me.     Insert provider disclosure and electronic signature here Provider Disclosure:  I agree with above History, Review of Systems, Physical exam and Plan.  I have reviewed the content of the documentation and have edited it as needed. I have personally performed the services documented here and the documentation accurately represents those services and the decisions I have made.    Brandi Rouse MD

## 2025-07-17 NOTE — NURSING NOTE
Arvin Britt's chief complaint for this visit includes:  Chief Complaint   Patient presents with    Follow Up     Ear cleaning     PCP: Teresa Champion    Referring Provider:  Referred Self, MD  No address on file    There were no vitals taken for this visit.

## 2025-07-17 NOTE — LETTER
7/17/2025      Arvin Britt  [id number] [place] [address]      Dear Colleague,    Thank you for referring your patient, Arvin Britt, to the [facility]. Please see a copy of my visit note below.    Chief Complaint   Patient presents with     Follow Up     Ear cleaning      PCP: Teresa Champion     Referring Provider: Referred Self    There were no vitals taken for this visit.    ENT Problem List:  Patient Active Problem List   Diagnosis     Meniere's disease, right      Current Medications:  Current Outpatient Medications   Medication Sig Dispense Refill     amLODIPine (NORVASC) 5 MG tablet Take 10 mg by mouth daily       aspirin (ASA) 81 MG EC tablet Take 81 mg by mouth daily       atorvastatin (LIPITOR) 20 MG tablet Take 20 mg by mouth At Bedtime       cholecalciferol 1000 UNIT TABS Take  by mouth daily.         diazepam (VALIUM) 5 MG tablet Take 1 tablet (5 mg) by mouth every 6 hours as needed for anxiety, vomiting, nausea or seizures 30 tablet 0     dimenhyDRINATE (DRAMAMINE) 50 MG tablet Take 1 tablet (50 mg) by mouth daily as needed for sleep. 60 tablet 0     fish oil-omega-3 fatty acids 1000 MG capsule Take 2 g by mouth daily       folic acid (FOLVITE) 1 MG tablet Take 1 mg by mouth       hydrALAZINE (APRESOLINE) 25 MG tablet Take 25 mg by mouth 3 times daily       isosorbide mononitrate (IMDUR) 30 MG 24 hr tablet Take 30 mg by mouth daily        lisinopril (PRINIVIL,ZESTRIL) 40 MG tablet Take 40 mg by mouth daily       meclizine (ANTIVERT) 25 MG tablet Take 1 tablet (25 mg) by mouth 3 times daily as needed for dizziness. 60 tablet 2     methotrexate 2.5 MG tablet Take 6 tablets by mouth on Sundays.  Have blood drawn 5 days after first dose.       methylPREDNISolone (MEDROL DOSEPAK) 4 MG tablet therapy pack Follow Package Directions 21 tablet 0     metoprolol tartrate (LOPRESSOR) 100 MG tablet Take 100 mg by mouth daily        Multiple Vitamin (MULTIVITAMIN OR) Take  by mouth daily       nitroglycerin (NITROSTAT) 0.3 MG SL tablet Place 0.3 mg under the tongue every 5 minutes as needed       triamterene-HCTZ (DYAZIDE) 37.5-25 MG capsule Take 1 capsule by mouth every morning 30 capsule 3     triamterene-HCTZ (DYAZIDE) 37.5-25 MG capsule Take by mouth 2 times daily       MECLIZINE HCL PO Take 25 mg by mouth daily (Patient not taking: Reported on 7/17/2025)       omeprazole (PRILOSEC) 40 MG DR capsule Take 40 mg by mouth daily       pantoprazole (PROTONIX) 40 MG EC tablet 2 times daily        No current facility-administered medications for this visit.     Surgical History:  Past Surgical History:   Procedure Laterality Date     CARDIAC STENTS[      2006     CHOLECYSTECTOMY       ENDOSCOPY       ENHANCE ENDOLYMPHATIC SAC, DEC SIGMOID SINUS, EXTND FACIAL RECESS APPRCH, MASTOIDECTOMY, BMT, COMBO  6/28/2012    Procedure: COMBINED ENHANCE ENDOLYMPHATIC SAC, DECOMPRESS SIGMOID SINUS, EXTENDED FACIAL RECESS APPROACH, COMPLETE MASTOIDECTOMY, MYRINGOTOMY, INSERT TUBE;  Right Endolympatic Sac Enchancement, Sigmoid Sinus Decompression, Extended Facial Recess Approach, Complete Mastoidectomy, Myringotomy &Tubes, Gentamycin Removal of Dermis;  Surgeon: Ricky Mueller MD;  [facility]     ENHANCE ENDOLYMPHATIC SAC, DEC SIGMOID SINUS, EXTND FACIAL RECESS APPRCH, MASTOIDECTOMY, BMT, COMBO Right 12/31/2018    Procedure: Right Endolymphatic Sac Enhancement, Sigmoid Sinus Decompression, Extended Facial Recess Approach, Complete Mastoidectomy, Removal Of Scar Tissue;  Surgeon: Ricky Mueller MD;  [facility]: [facility]     ENT SURGERY       INJECT STEROID (LOCATION) Right 7/2/2024    Procedure: INJECTION, STEROID, right ear;  Surgeon: Brandi Rouse MD;  Location: [facility]     MYRINGOTOMY, INSERT TUBE, COMBINED Right 7/2/2024    Procedure: COMBINED MYRINGOTOMY, INSERT TUBE;  Surgeon: Brandi Rouse MD;  Location: [facility] OR     SURGICAL PROCEDURE FOR MENIERE'S [      2000 AND 1977     [facility]  ORAL SURGERY PROCEDURE         HPI  Pleasant 75 year old male presents today as a(n) established patient for right Meniere's disease and bilateral SNHL. Was last seen by me in ENT 3/28/25 with removal of right PE tube at that time.     Today he presents for ear cleaning. States that he recently had 3 year checkup on hearing aids and it was noted he had a cerumen buildup of dark wax. States that they do not remove wax.     Review of Systems   Constitutional: Negative.    HENT:  Positive for hearing loss. Negative for ear discharge and ear pain.    Eyes: Negative.    Respiratory: Negative.     Gastrointestinal: Negative.    Skin: Negative.    Neurological:  Negative for dizziness.   Endo/Heme/Allergies: Negative.        Physical Exam  Vitals and nursing note reviewed.   Constitutional:       Appearance: Normal appearance.   HENT:      Head: Normocephalic and atraumatic.      Right Ear: Tympanic membrane, ear canal and external ear normal. There is impacted cerumen.      Left Ear: Tympanic membrane, ear canal and external ear normal. There is impacted cerumen.      Ears:      Comments: Bilateral ear canals examined under microscope     Nose: Nose normal. No septal deviation or rhinorrhea.      Right Nostril: No occlusion.      Left Nostril: No occlusion.      Right Turbinates: Not enlarged or swollen.      Left Turbinates: Not enlarged or swollen.      Mouth/Throat:      Mouth: Mucous membranes are moist.      Pharynx: Oropharynx is clear. Uvula midline.   Eyes:      Extraocular Movements: Extraocular movements intact.      Conjunctiva/sclera: Conjunctivae normal.      Pupils: Pupils are equal, round, and reactive to light.   Neurological:      Mental Status: He is alert.     Right TM false membrane     Ear wax removal: The patient was seen in the room. An informed consent was obtained from the patient. His ears were evaluated under the microscope. Right ear canal was blocked 60% with ear wax that was suctioned. The  left ear canal was blocked 30% with ear wax that was suctioned. He tolerated the procedure well and left the room no complications.    AUDIOGRAM: The patient underwent an audiogram most recently 9/10/24      A/P  This pleasant patient has right Meniere's disease and bilateral SNHL. Audiogram and prior records were independently reviewed and discussed in detail with the patient. On examination he appears to have a cerumen impaction to bilateral ear canals so this was removed with suction today. No other concerning findings on exam and vertigo is well controlled.     Follow up in clinic in 6 months.    Scribe/Staff:    Scribe Disclosure:   I, Daria Sawant, am serving as a scribe; to document services personally performed by Brandi Rouse MD based on data collection and the provider's statements to me.     Insert provider disclosure and electronic signature here Provider Disclosure:  I agree with above History, Review of Systems, Physical exam and Plan.  I have reviewed the content of the documentation and have edited it as needed. I have personally performed the services documented here and the documentation accurately represents those services and the decisions I have made.    Brandi Rouse MD         Again, thank you for allowing me to participate in the care of your patient.        Sincerely,        Brandi Rouse MD    Electronically signed

## (undated) DEVICE — STPL SKIN 35W APPOSE 8886803712

## (undated) DEVICE — BOWL 32OZ STERILE 01232

## (undated) DEVICE — BONE WAX 2.5GM W31G

## (undated) DEVICE — GLOVE PROTEXIS W/NEU-THERA 6.0  2D73TE60

## (undated) DEVICE — BNDG KLING 3" 2232

## (undated) DEVICE — SUCTION CANISTER MEDIVAC LINER 1500ML W/LID 65651-515

## (undated) DEVICE — SOL WATER IRRIG 1000ML BOTTLE 07139-09

## (undated) DEVICE — BLADE CLIPPER SGL USE 9680

## (undated) DEVICE — GLOVE PROTEXIS W/NEU-THERA 7.5  2D73TE75

## (undated) DEVICE — ESU GROUND PAD UNIVERSAL W/O CORD

## (undated) DEVICE — SYR EAR BULB 3OZ 0035830

## (undated) DEVICE — DRSG ADAPTIC 3X8" 6113

## (undated) DEVICE — DRSG KERLIX FLUFFS X5

## (undated) DEVICE — DRSG ABDOMINAL PAD UNSTERILE 8X10" WND152764B

## (undated) DEVICE — BLADE KNIFE BEAVER 7" 71N

## (undated) DEVICE — DRSG GAUZE 4X4" 2187

## (undated) DEVICE — DRAPE MICRO ZEISS MD 62"X40.5" OPMI 6 09-MK904

## (undated) DEVICE — SUCTION TUBING 10' N1010

## (undated) DEVICE — SU VICRYL 3-0 PS-1 18" UND J683G

## (undated) DEVICE — SUCTION MANIFOLD DORNOCH ULTRA CART UL-CL500

## (undated) DEVICE — Device

## (undated) DEVICE — POSITIONER ARMBOARD FOAM 1PAIR LF FP-ARMB1

## (undated) DEVICE — ESU CORD BIPOLAR GREEN 10-4000

## (undated) DEVICE — SOL NACL 0.9% IRRIG 1000ML BOTTLE 2F7124

## (undated) DEVICE — TUBING SUCTION 10'X3/16" N510

## (undated) DEVICE — LINEN TOWEL PACK X5 5464

## (undated) DEVICE — GLOVE BIOGEL PI ULTRATOUCH G SZ 7.5 42175

## (undated) DEVICE — STRAP KNEE/BODY 31143004

## (undated) DEVICE — SPONGE SURGIFOAM 100 1974

## (undated) DEVICE — SOL WATER IRRIG 1000ML BOTTLE 2F7114

## (undated) RX ORDER — FENTANYL CITRATE 50 UG/ML
INJECTION, SOLUTION INTRAMUSCULAR; INTRAVENOUS
Status: DISPENSED
Start: 2018-12-31

## (undated) RX ORDER — ONDANSETRON 2 MG/ML
INJECTION INTRAMUSCULAR; INTRAVENOUS
Status: DISPENSED
Start: 2018-12-31

## (undated) RX ORDER — HYDROMORPHONE HYDROCHLORIDE 1 MG/ML
INJECTION, SOLUTION INTRAMUSCULAR; INTRAVENOUS; SUBCUTANEOUS
Status: DISPENSED
Start: 2018-12-31

## (undated) RX ORDER — FENTANYL CITRATE 50 UG/ML
INJECTION, SOLUTION INTRAMUSCULAR; INTRAVENOUS
Status: DISPENSED
Start: 2024-07-02

## (undated) RX ORDER — OXYCODONE HYDROCHLORIDE 5 MG/1
TABLET ORAL
Status: DISPENSED
Start: 2018-12-31

## (undated) RX ORDER — DEXAMETHASONE SODIUM PHOSPHATE 4 MG/ML
INJECTION, SOLUTION INTRA-ARTICULAR; INTRALESIONAL; INTRAMUSCULAR; INTRAVENOUS; SOFT TISSUE
Status: DISPENSED
Start: 2024-07-02

## (undated) RX ORDER — EPHEDRINE SULFATE 50 MG/ML
INJECTION, SOLUTION INTRAMUSCULAR; INTRAVENOUS; SUBCUTANEOUS
Status: DISPENSED
Start: 2018-12-31

## (undated) RX ORDER — BACITRACIN ZINC 500 [USP'U]/G
OINTMENT TOPICAL
Status: DISPENSED
Start: 2018-12-31

## (undated) RX ORDER — DEXAMETHASONE SODIUM PHOSPHATE 4 MG/ML
INJECTION, SOLUTION INTRA-ARTICULAR; INTRALESIONAL; INTRAMUSCULAR; INTRAVENOUS; SOFT TISSUE
Status: DISPENSED
Start: 2018-12-31

## (undated) RX ORDER — LIDOCAINE HYDROCHLORIDE 20 MG/ML
INJECTION, SOLUTION EPIDURAL; INFILTRATION; INTRACAUDAL; PERINEURAL
Status: DISPENSED
Start: 2018-12-31

## (undated) RX ORDER — GENTAMICIN 40 MG/ML
INJECTION, SOLUTION INTRAMUSCULAR; INTRAVENOUS
Status: DISPENSED
Start: 2018-12-31

## (undated) RX ORDER — PROPOFOL 10 MG/ML
INJECTION, EMULSION INTRAVENOUS
Status: DISPENSED
Start: 2018-12-31

## (undated) RX ORDER — ACETAMINOPHEN 325 MG/1
TABLET ORAL
Status: DISPENSED
Start: 2024-07-02